# Patient Record
Sex: FEMALE | Race: BLACK OR AFRICAN AMERICAN | Employment: OTHER | URBAN - METROPOLITAN AREA
[De-identification: names, ages, dates, MRNs, and addresses within clinical notes are randomized per-mention and may not be internally consistent; named-entity substitution may affect disease eponyms.]

---

## 2019-05-29 ENCOUNTER — HOSPITAL ENCOUNTER (OUTPATIENT)
Dept: PREADMISSION TESTING | Age: 84
Discharge: HOME | End: 2019-05-29
Payer: MEDICARE

## 2019-05-29 DIAGNOSIS — Z01.818 PREOPERATIVE EXAMINATION, UNSPECIFIED: ICD-10-CM

## 2019-05-29 LAB
ANION GAP SERPL CALC-SCNC: 8 MMOL/L (ref 3–18)
BUN SERPL-MCNC: 16 MG/DL (ref 7–18)
BUN/CREAT SERPL: 17 (ref 12–20)
CALCIUM SERPL-MCNC: 9.4 MG/DL (ref 8.5–10.1)
CHLORIDE SERPL-SCNC: 109 MMOL/L (ref 100–108)
CO2 SERPL-SCNC: 28 MMOL/L (ref 21–32)
CREAT SERPL-MCNC: 0.92 MG/DL (ref 0.6–1.3)
GLUCOSE SERPL-MCNC: 75 MG/DL (ref 74–99)
HCT VFR BLD AUTO: 35.4 % (ref 35–45)
HGB BLD-MCNC: 11.3 G/DL (ref 12–16)
POTASSIUM SERPL-SCNC: 4.3 MMOL/L (ref 3.5–5.5)
SODIUM SERPL-SCNC: 145 MMOL/L (ref 136–145)

## 2019-05-29 PROCEDURE — 93005 ELECTROCARDIOGRAM TRACING: CPT

## 2019-05-29 PROCEDURE — 80048 BASIC METABOLIC PNL TOTAL CA: CPT

## 2019-05-29 PROCEDURE — 36415 COLL VENOUS BLD VENIPUNCTURE: CPT

## 2019-05-29 PROCEDURE — 85018 HEMOGLOBIN: CPT

## 2019-05-30 LAB
ATRIAL RATE: 58 BPM
CALCULATED P AXIS, ECG09: 62 DEGREES
CALCULATED R AXIS, ECG10: 55 DEGREES
CALCULATED T AXIS, ECG11: 72 DEGREES
DIAGNOSIS, 93000: NORMAL
FAX TO INFO,FAXT: NORMAL
FAX TO NUMBER,FAXN: NORMAL
P-R INTERVAL, ECG05: 196 MS
Q-T INTERVAL, ECG07: 460 MS
QRS DURATION, ECG06: 86 MS
QTC CALCULATION (BEZET), ECG08: 451 MS
VENTRICULAR RATE, ECG03: 58 BPM

## 2019-06-13 ENCOUNTER — HOSPITAL ENCOUNTER (OUTPATIENT)
Dept: LAB | Age: 84
Discharge: HOME | End: 2019-06-13
Payer: MEDICARE

## 2019-06-13 PROCEDURE — 88309 TISSUE EXAM BY PATHOLOGIST: CPT

## 2019-06-13 PROCEDURE — 88311 DECALCIFY TISSUE: CPT

## 2019-06-13 PROCEDURE — 88305 TISSUE EXAM BY PATHOLOGIST: CPT

## 2023-03-01 ENCOUNTER — HOSPITAL ENCOUNTER (INPATIENT)
Facility: HOSPITAL | Age: 88
LOS: 4 days | Discharge: HOME HEALTH | DRG: 505 | End: 2023-03-05
Attending: EMERGENCY MEDICINE | Admitting: FAMILY MEDICINE
Payer: MEDICARE

## 2023-03-01 DIAGNOSIS — M86.9 OSTEOMYELITIS OF SECOND TOE OF RIGHT FOOT (HCC): Primary | ICD-10-CM

## 2023-03-01 DIAGNOSIS — M86.9 OSTEOMYELITIS OF RIGHT FOOT, UNSPECIFIED TYPE (HCC): ICD-10-CM

## 2023-03-01 PROBLEM — I35.0 AORTIC VALVE STENOSIS, NONRHEUMATIC: Status: ACTIVE | Noted: 2023-03-01

## 2023-03-01 PROBLEM — M19.079 OSTEOARTHRITIS OF TOE: Status: ACTIVE | Noted: 2023-03-01

## 2023-03-01 PROBLEM — M19.079 OSTEOARTHRITIS OF TOE: Status: RESOLVED | Noted: 2023-03-01 | Resolved: 2023-03-01

## 2023-03-01 PROBLEM — I10 HYPERTENSION: Status: ACTIVE | Noted: 2023-03-01

## 2023-03-01 PROBLEM — H40.9 GLAUCOMA: Status: ACTIVE | Noted: 2023-03-01

## 2023-03-01 LAB
ANION GAP SERPL CALC-SCNC: 4 MMOL/L (ref 3–18)
APPEARANCE UR: NORMAL
BASOPHILS # BLD: 0.1 K/UL (ref 0–0.1)
BASOPHILS NFR BLD: 1 % (ref 0–2)
BILIRUB UR QL: NEGATIVE
BUN SERPL-MCNC: 31 MG/DL (ref 7–18)
BUN/CREAT SERPL: 34 (ref 12–20)
CALCIUM SERPL-MCNC: 9.4 MG/DL (ref 8.5–10.1)
CHLORIDE SERPL-SCNC: 115 MMOL/L (ref 100–111)
CO2 SERPL-SCNC: 22 MMOL/L (ref 21–32)
COLOR UR: YELLOW
CREAT SERPL-MCNC: 0.91 MG/DL (ref 0.6–1.3)
DIFFERENTIAL METHOD BLD: ABNORMAL
EOSINOPHIL # BLD: 0.2 K/UL (ref 0–0.4)
EOSINOPHIL NFR BLD: 4 % (ref 0–5)
ERYTHROCYTE [DISTWIDTH] IN BLOOD BY AUTOMATED COUNT: 14.8 % (ref 11.6–14.5)
GLUCOSE SERPL-MCNC: 85 MG/DL (ref 74–99)
GLUCOSE UR STRIP.AUTO-MCNC: NEGATIVE MG/DL
HCT VFR BLD AUTO: 34 % (ref 35–45)
HGB BLD-MCNC: 10.7 G/DL (ref 12–16)
HGB UR QL STRIP: NEGATIVE
IMM GRANULOCYTES # BLD AUTO: 0 K/UL (ref 0–0.04)
IMM GRANULOCYTES NFR BLD AUTO: 0 % (ref 0–0.5)
KETONES UR QL STRIP.AUTO: NEGATIVE MG/DL
LEUKOCYTE ESTERASE UR QL STRIP.AUTO: NEGATIVE
LYMPHOCYTES # BLD: 2.7 K/UL (ref 0.9–3.6)
LYMPHOCYTES NFR BLD: 51 % (ref 21–52)
MCH RBC QN AUTO: 29.7 PG (ref 24–34)
MCHC RBC AUTO-ENTMCNC: 31.5 G/DL (ref 31–37)
MCV RBC AUTO: 94.4 FL (ref 78–100)
MONOCYTES # BLD: 0.5 K/UL (ref 0.05–1.2)
MONOCYTES NFR BLD: 10 % (ref 3–10)
NEUTS SEG # BLD: 1.8 K/UL (ref 1.8–8)
NEUTS SEG NFR BLD: 34 % (ref 40–73)
NITRITE UR QL STRIP.AUTO: NEGATIVE
NRBC # BLD: 0 K/UL (ref 0–0.01)
NRBC BLD-RTO: 0 PER 100 WBC
PH UR STRIP: 6.5 (ref 5–8)
PLATELET # BLD AUTO: 174 K/UL (ref 135–420)
PMV BLD AUTO: 11.7 FL (ref 9.2–11.8)
POTASSIUM SERPL-SCNC: 4.7 MMOL/L (ref 3.5–5.5)
PROT UR STRIP-MCNC: NEGATIVE MG/DL
RBC # BLD AUTO: 3.6 M/UL (ref 4.2–5.3)
SODIUM SERPL-SCNC: 141 MMOL/L (ref 136–145)
SP GR UR REFRACTOMETRY: 1.02 (ref 1–1.03)
UROBILINOGEN UR QL STRIP.AUTO: 0.2 EU/DL (ref 0.2–1)
WBC # BLD AUTO: 5.3 K/UL (ref 4.6–13.2)

## 2023-03-01 PROCEDURE — 6360000002 HC RX W HCPCS: Performed by: PHYSICIAN ASSISTANT

## 2023-03-01 PROCEDURE — 2580000003 HC RX 258: Performed by: PHYSICIAN ASSISTANT

## 2023-03-01 PROCEDURE — 2580000003 HC RX 258: Performed by: FAMILY MEDICINE

## 2023-03-01 PROCEDURE — 99285 EMERGENCY DEPT VISIT HI MDM: CPT

## 2023-03-01 PROCEDURE — 85025 COMPLETE CBC W/AUTO DIFF WBC: CPT

## 2023-03-01 PROCEDURE — 80048 BASIC METABOLIC PNL TOTAL CA: CPT

## 2023-03-01 PROCEDURE — 96374 THER/PROPH/DIAG INJ IV PUSH: CPT

## 2023-03-01 PROCEDURE — 81003 URINALYSIS AUTO W/O SCOPE: CPT

## 2023-03-01 PROCEDURE — 1100000000 HC RM PRIVATE

## 2023-03-01 RX ORDER — FAMOTIDINE 20 MG/1
20 TABLET, FILM COATED ORAL 2 TIMES DAILY
COMMUNITY

## 2023-03-01 RX ORDER — MORPHINE SULFATE 2 MG/ML
2 INJECTION, SOLUTION INTRAMUSCULAR; INTRAVENOUS
Status: DISCONTINUED | OUTPATIENT
Start: 2023-03-01 | End: 2023-03-05 | Stop reason: HOSPADM

## 2023-03-01 RX ORDER — AMLODIPINE BESYLATE AND ATORVASTATIN CALCIUM 10; 10 MG/1; MG/1
10 TABLET, FILM COATED ORAL
COMMUNITY
Start: 2023-03-01

## 2023-03-01 RX ORDER — SODIUM CHLORIDE 0.9 % (FLUSH) 0.9 %
5-40 SYRINGE (ML) INJECTION PRN
Status: DISCONTINUED | OUTPATIENT
Start: 2023-03-01 | End: 2023-03-05 | Stop reason: HOSPADM

## 2023-03-01 RX ORDER — OXYCODONE HYDROCHLORIDE 5 MG/1
10 TABLET ORAL EVERY 4 HOURS PRN
Status: DISCONTINUED | OUTPATIENT
Start: 2023-03-01 | End: 2023-03-02

## 2023-03-01 RX ORDER — SODIUM CHLORIDE 0.9 % (FLUSH) 0.9 %
5-40 SYRINGE (ML) INJECTION EVERY 12 HOURS SCHEDULED
Status: DISCONTINUED | OUTPATIENT
Start: 2023-03-01 | End: 2023-03-05 | Stop reason: HOSPADM

## 2023-03-01 RX ORDER — ACETAMINOPHEN 325 MG/1
650 TABLET ORAL EVERY 4 HOURS PRN
Status: DISCONTINUED | OUTPATIENT
Start: 2023-03-01 | End: 2023-03-05 | Stop reason: HOSPADM

## 2023-03-01 RX ORDER — PREGABALIN 50 MG/1
50 CAPSULE ORAL 3 TIMES DAILY
COMMUNITY

## 2023-03-01 RX ORDER — OXYCODONE HYDROCHLORIDE AND ACETAMINOPHEN 5; 325 MG/1; MG/1
1 TABLET ORAL EVERY 4 HOURS PRN
COMMUNITY

## 2023-03-01 RX ORDER — ONDANSETRON 4 MG/1
4 TABLET, ORALLY DISINTEGRATING ORAL EVERY 8 HOURS PRN
Status: DISCONTINUED | OUTPATIENT
Start: 2023-03-01 | End: 2023-03-05 | Stop reason: HOSPADM

## 2023-03-01 RX ORDER — TRIAMTERENE AND HYDROCHLOROTHIAZIDE 37.5; 25 MG/1; MG/1
1 TABLET ORAL DAILY
COMMUNITY
Start: 2019-08-22

## 2023-03-01 RX ORDER — OXYCODONE HYDROCHLORIDE 5 MG/1
5 TABLET ORAL EVERY 4 HOURS PRN
Status: DISCONTINUED | OUTPATIENT
Start: 2023-03-01 | End: 2023-03-02

## 2023-03-01 RX ORDER — POLYETHYLENE GLYCOL 3350 17 G/17G
17 POWDER, FOR SOLUTION ORAL DAILY PRN
Status: DISCONTINUED | OUTPATIENT
Start: 2023-03-01 | End: 2023-03-05 | Stop reason: HOSPADM

## 2023-03-01 RX ORDER — MONTELUKAST SODIUM 10 MG/1
10 TABLET ORAL NIGHTLY
COMMUNITY

## 2023-03-01 RX ORDER — SODIUM CHLORIDE 9 MG/ML
INJECTION, SOLUTION INTRAVENOUS PRN
Status: DISCONTINUED | OUTPATIENT
Start: 2023-03-01 | End: 2023-03-05 | Stop reason: HOSPADM

## 2023-03-01 RX ORDER — ONDANSETRON 2 MG/ML
4 INJECTION INTRAMUSCULAR; INTRAVENOUS EVERY 6 HOURS PRN
Status: DISCONTINUED | OUTPATIENT
Start: 2023-03-01 | End: 2023-03-05 | Stop reason: HOSPADM

## 2023-03-01 RX ADMIN — VANCOMYCIN HYDROCHLORIDE 1250 MG: 10 INJECTION, POWDER, LYOPHILIZED, FOR SOLUTION INTRAVENOUS at 18:52

## 2023-03-01 RX ADMIN — SODIUM CHLORIDE, PRESERVATIVE FREE 5 ML: 5 INJECTION INTRAVENOUS at 23:16

## 2023-03-01 ASSESSMENT — PAIN DESCRIPTION - LOCATION: LOCATION: FOOT

## 2023-03-01 ASSESSMENT — PAIN - FUNCTIONAL ASSESSMENT
PAIN_FUNCTIONAL_ASSESSMENT: 0-10
PAIN_FUNCTIONAL_ASSESSMENT: NONE - DENIES PAIN

## 2023-03-01 ASSESSMENT — PAIN DESCRIPTION - DESCRIPTORS: DESCRIPTORS: ACHING

## 2023-03-01 ASSESSMENT — PAIN DESCRIPTION - ORIENTATION: ORIENTATION: RIGHT

## 2023-03-01 NOTE — ED PROVIDER NOTES
[facility] EMERGENCY DEPT  EMERGENCY DEPARTMENT ENCOUNTER       Pt Name: Gurdeep Lee  MRN: [id number]  [place] 1/16/1931  Date of evaluation: 3/1/2023  Provider: GUERO Mathias   PCP: Al Trujillo MD  Note Started: 7:18 PM 3/1/23     CHIEF COMPLAINT       Chief Complaint   Patient presents with    Foot Pain        HISTORY OF PRESENT ILLNESS: 1 or more elements      History From: Patient  HPI Limitations: None  Chronic Conditions: none  Social Determinants affecting Dx or Tx: none      Gurdeep Lee is a 80 y.o. female who presents to ED c/o discoloration to the right second toe. Patient states she has had discomfort swelling and redness to her right second toe for 2-3 months, seen in urgent care, took unknown medication. She followed up with [facility] podiatrist Dr. Braeden Morton yesterday and presents to ED today stating that she was supposed to be admitted for right second toe amputation tomorrow. She denies any known medical problems, does not currently take any medications. She had a left second toe amputation in the past without complication. Nursing Notes were all reviewed and agreed with or any disagreements were addressed in the HPI. PAST HISTORY     Past Medical History:  No past medical history on file. Past Surgical History:  No past surgical history on file. Family History:  No family history on file. Social History:  Social History     Socioeconomic History    Marital status:        Allergies: Allergies   Allergen Reactions    Ace Inhibitors Anaphylaxis     Other reaction(s): anaphylaxis/angioedema      Cephalexin Rash and Other (See Comments)    Gabapentin Other (See Comments)     Other reaction(s): gi distress  Nausea at 300 mg dose. Nausea at 300 mg dose.        Iodinated Contrast Media Other (See Comments)     X-Ray Dye  Other reaction(s): unknown      Iodine Other (See Comments)    Timolol      Shortness of breath       CURRENT MEDICATIONS      Current Facility-Administered Medications   Medication Dose Route Frequency Provider Last Rate Last Admin    vancomycin (VANCOCIN) 1250 mg in sodium chloride 0.9% 250 mL IVPB  1,250 mg IntraVENous Once GUERO Taylor 166.7 mL/hr at 03/01/23 1852 1,250 mg at 03/01/23 7450    Vancomycin-Pharmacy to dose   Other RX Placeholder Jill GUERO Wick         No current outpatient medications on file. PHYSICAL EXAM      Vitals:    03/01/23 1455 03/01/23 1857   BP: (!) 153/56 (!) 143/55   Pulse: 65 55   Resp: 17    Temp: 97.5 °F (36.4 °C)    SpO2: 98%    Weight: 140 lb (63.5 kg)    Height: 5' 2\" (1.575 m)      Physical Exam  Vital signs and nursing notes reviewed. CONSTITUTIONAL: Alert. Well-appearing; well-nourished; in no apparent distress. HEAD: Normocephalic; atraumatic. EXT: RLE: Second toe is mildly swollen, hyperpigmented and tender with approximately 1 cm superficial ulceration medial mid toe. Rest of toes nontender/atraumatic without discoloration. SKIN: Normal for age and race; warm; dry; good turgor; no apparent lesions or exudate. NEURO: A & O x3. PSYCH:  Mood and affect appropriate.         DIAGNOSTIC RESULTS   LABS:    Recent Results (from the past 24 hour(s))   Urinalysis    Collection Time: 03/01/23  2:59 PM   Result Value Ref Range    Color, UA YELLOW      Appearance CLOUDY      Specific Gravity, UA 1.022 1.005 - 1.030      pH, Urine 6.5 5.0 - 8.0      Protein, UA Negative NEG mg/dL    Glucose, UA Negative NEG mg/dL    Ketones, Urine Negative NEG mg/dL    Bilirubin Urine Negative NEG      Blood, Urine Negative NEG      Urobilinogen, Urine 0.2 0.2 - 1.0 EU/dL    Nitrite, Urine Negative NEG      Leukocyte Esterase, Urine Negative NEG     CBC with Auto Differential    Collection Time: 03/01/23  6:08 PM   Result Value Ref Range    WBC 5.3 4.6 - 13.2 K/uL    RBC 3.60 (L) 4.20 - 5.30 M/uL    Hemoglobin 10.7 (L) 12.0 - 16.0 g/dL    Hematocrit 34.0 (L) 35.0 - 45.0 %    MCV 94.4 78.0 - 100.0 FL    MCH 29.7 24.0 - 34.0 PG    MCHC 31.5 31.0 - 37.0 g/dL    RDW 14.8 (H) 11.6 - 14.5 %    Platelets 072 177 - 665 K/uL    MPV 11.7 9.2 - 11.8 FL    Nucleated RBCs 0.0 0  WBC    nRBC 0.00 0.00 - 0.01 K/uL    Seg Neutrophils 34 (L) 40 - 73 %    Lymphocytes 51 21 - 52 %    Monocytes 10 3 - 10 %    Eosinophils % 4 0 - 5 %    Basophils 1 0 - 2 %    Immature Granulocytes 0 0.0 - 0.5 %    Segs Absolute 1.8 1.8 - 8.0 K/UL    Absolute Lymph # 2.7 0.9 - 3.6 K/UL    Absolute Mono # 0.5 0.05 - 1.2 K/UL    Absolute Eos # 0.2 0.0 - 0.4 K/UL    Basophils Absolute 0.1 0.0 - 0.1 K/UL    Absolute Immature Granulocyte 0.0 0.00 - 0.04 K/UL    Differential Type AUTOMATED     Basic Metabolic Panel    Collection Time: 03/01/23  6:08 PM   Result Value Ref Range    Sodium 141 136 - 145 mmol/L    Potassium 4.7 3.5 - 5.5 mmol/L    Chloride 115 (H) 100 - 111 mmol/L    CO2 22 21 - 32 mmol/L    Anion Gap 4 3.0 - 18 mmol/L    Glucose 85 74 - 99 mg/dL    BUN 31 (H) 7.0 - 18 MG/DL    Creatinine 0.91 0.6 - 1.3 MG/DL    Bun/Cre Ratio 34 (H) 12 - 20      Est, Glom Filt Rate 59 (L) >60 ml/min/1.73m2    Calcium 9.4 8.5 - 10.1 MG/DL       Labs Reviewed   CBC WITH AUTO DIFFERENTIAL - Abnormal; Notable for the following components:       Result Value    RBC 3.60 (*)     Hemoglobin 10.7 (*)     Hematocrit 34.0 (*)     RDW 14.8 (*)     Seg Neutrophils 34 (*)     All other components within normal limits   BASIC METABOLIC PANEL - Abnormal; Notable for the following components:    Chloride 115 (*)     BUN 31 (*)     Bun/Cre Ratio 34 (*)     Est, Glom Filt Rate 59 (*)     All other components within normal limits   URINALYSIS     No orders to display           PROCEDURES   Unless otherwise noted below, none  Procedures         CRITICAL CARE TIME   none    EMERGENCY DEPARTMENT COURSE and DIFFERENTIAL DIAGNOSIS/MDM   Vitals:    Vitals:    03/01/23 1455 03/01/23 1857   BP: (!) 153/56 (!) 143/55   Pulse: 65 55   Resp: 17    Temp: 97.5 °F (36.4 °C)    SpO2: 98% Weight: 140 lb (63.5 kg)    Height: 5' 2\" (1.575 m)        Patient was given the following medications:  Medications   vancomycin (VANCOCIN) 1250 mg in sodium chloride 0.9% 250 mL IVPB (1,250 mg IntraVENous New Bag 3/1/23 584)   Vancomycin-Pharmacy to dose (has no administration in time range)           Records Reviewed (source and summary): Nursing notes. ED COURSE  ED Course as of 03/01/23 1918   Wed Mar 01, 2023   174 Case discussed with podiatrist with [facility] Flores, who recommends vancomycine for right second toe osteomyelitis, admission to the hospitalist and he will consult after medical/cardiac clearance with plan for right second toe amputation. He has her on the OR schedule for tomorrow. [MD]   0560 Case discussed with hospitalist Dr. Jamel Blandon, who will admit to medical floor. [MD]      ED Course User Index  [MD] GUERO Spears       [facility] Decision Makin y.o. female  In evaluation of the above differential diagnosis, consideration was given to the following tests and treatments: labs. I discussed each of these tests and considerations with the patient. They agree with the plan of admission and for medical clearance and then amputation of right second toe, as per podiatrist recommendation. FINAL IMPRESSION     1. Osteomyelitis of second toe of right foot [facility])            DISPOSITION/PLAN   DISPOSITION Admitted 2023 07:17:15 PM      Admitted        I am the Primary Clinician of Record. (Please note that parts of this dictation were completed with voice recognition software. Quite often unanticipated grammatical, syntax, homophones, and other interpretive errors are inadvertently transcribed by the computer software. Please disregards these errors.  Please excuse any errors that have escaped final proofreading.)       Sergio Spears  23

## 2023-03-01 NOTE — ED TRIAGE NOTES
Pt report that she was instructed to visit ed by PCP, MD Lo. Pt report that she fell sept 2022, 2nd toe, right foot, started to appear black. PCP recommended amputation. Denies hx of DM

## 2023-03-02 ENCOUNTER — ANESTHESIA (OUTPATIENT)
Facility: HOSPITAL | Age: 88
End: 2023-03-02
Payer: MEDICARE

## 2023-03-02 ENCOUNTER — ANESTHESIA EVENT (OUTPATIENT)
Facility: HOSPITAL | Age: 88
End: 2023-03-02
Payer: MEDICARE

## 2023-03-02 LAB
ANION GAP SERPL CALC-SCNC: 5 MMOL/L (ref 3–18)
BUN SERPL-MCNC: 27 MG/DL (ref 7–18)
BUN/CREAT SERPL: 28 (ref 12–20)
CALCIUM SERPL-MCNC: 9 MG/DL (ref 8.5–10.1)
CHLORIDE SERPL-SCNC: 115 MMOL/L (ref 100–111)
CO2 SERPL-SCNC: 22 MMOL/L (ref 21–32)
CREAT SERPL-MCNC: 0.96 MG/DL (ref 0.6–1.3)
ERYTHROCYTE [DISTWIDTH] IN BLOOD BY AUTOMATED COUNT: 15.2 % (ref 11.6–14.5)
GLUCOSE SERPL-MCNC: 84 MG/DL (ref 74–99)
HCT VFR BLD AUTO: 32.5 % (ref 35–45)
HGB BLD-MCNC: 10.2 G/DL (ref 12–16)
INR PPP: 1 (ref 0.8–1.2)
MCH RBC QN AUTO: 30.6 PG (ref 24–34)
MCHC RBC AUTO-ENTMCNC: 31.4 G/DL (ref 31–37)
MCV RBC AUTO: 97.6 FL (ref 78–100)
NRBC # BLD: 0 K/UL (ref 0–0.01)
NRBC BLD-RTO: 0 PER 100 WBC
PLATELET # BLD AUTO: 160 K/UL (ref 135–420)
PMV BLD AUTO: 11.9 FL (ref 9.2–11.8)
POTASSIUM SERPL-SCNC: 4.3 MMOL/L (ref 3.5–5.5)
PROTHROMBIN TIME: 13.6 SEC (ref 11.5–15.2)
RBC # BLD AUTO: 3.33 M/UL (ref 4.2–5.3)
SODIUM SERPL-SCNC: 142 MMOL/L (ref 136–145)
WBC # BLD AUTO: 6.1 K/UL (ref 4.6–13.2)

## 2023-03-02 PROCEDURE — 87205 SMEAR GRAM STAIN: CPT

## 2023-03-02 PROCEDURE — 88311 DECALCIFY TISSUE: CPT

## 2023-03-02 PROCEDURE — 6360000002 HC RX W HCPCS: Performed by: PODIATRIST

## 2023-03-02 PROCEDURE — 1100000000 HC RM PRIVATE

## 2023-03-02 PROCEDURE — 2580000003 HC RX 258: Performed by: PODIATRIST

## 2023-03-02 PROCEDURE — 97116 GAIT TRAINING THERAPY: CPT

## 2023-03-02 PROCEDURE — 3700000001 HC ADD 15 MINUTES (ANESTHESIA): Performed by: PODIATRIST

## 2023-03-02 PROCEDURE — 36415 COLL VENOUS BLD VENIPUNCTURE: CPT

## 2023-03-02 PROCEDURE — 99221 1ST HOSP IP/OBS SF/LOW 40: CPT | Performed by: NURSE PRACTITIONER

## 2023-03-02 PROCEDURE — 2709999900 HC NON-CHARGEABLE SUPPLY: Performed by: PODIATRIST

## 2023-03-02 PROCEDURE — 97530 THERAPEUTIC ACTIVITIES: CPT

## 2023-03-02 PROCEDURE — 3600000002 HC SURGERY LEVEL 2 BASE: Performed by: PODIATRIST

## 2023-03-02 PROCEDURE — 6360000002 HC RX W HCPCS: Performed by: NURSE ANESTHETIST, CERTIFIED REGISTERED

## 2023-03-02 PROCEDURE — 88305 TISSUE EXAM BY PATHOLOGIST: CPT

## 2023-03-02 PROCEDURE — 6370000000 HC RX 637 (ALT 250 FOR IP): Performed by: PODIATRIST

## 2023-03-02 PROCEDURE — 97162 PT EVAL MOD COMPLEX 30 MIN: CPT

## 2023-03-02 PROCEDURE — 7100000000 HC PACU RECOVERY - FIRST 15 MIN: Performed by: PODIATRIST

## 2023-03-02 PROCEDURE — 85027 COMPLETE CBC AUTOMATED: CPT

## 2023-03-02 PROCEDURE — 2500000003 HC RX 250 WO HCPCS: Performed by: NURSE ANESTHETIST, CERTIFIED REGISTERED

## 2023-03-02 PROCEDURE — 3600000012 HC SURGERY LEVEL 2 ADDTL 15MIN: Performed by: PODIATRIST

## 2023-03-02 PROCEDURE — 6370000000 HC RX 637 (ALT 250 FOR IP): Performed by: HOSPITALIST

## 2023-03-02 PROCEDURE — 85610 PROTHROMBIN TIME: CPT

## 2023-03-02 PROCEDURE — 7100000001 HC PACU RECOVERY - ADDTL 15 MIN: Performed by: PODIATRIST

## 2023-03-02 PROCEDURE — 2500000003 HC RX 250 WO HCPCS: Performed by: PODIATRIST

## 2023-03-02 PROCEDURE — 6360000002 HC RX W HCPCS: Performed by: HOSPITALIST

## 2023-03-02 PROCEDURE — 0Y6R0Z0 DETACHMENT AT RIGHT 2ND TOE, COMPLETE, OPEN APPROACH: ICD-10-PCS | Performed by: PODIATRIST

## 2023-03-02 PROCEDURE — 80048 BASIC METABOLIC PNL TOTAL CA: CPT

## 2023-03-02 PROCEDURE — 3700000000 HC ANESTHESIA ATTENDED CARE: Performed by: PODIATRIST

## 2023-03-02 PROCEDURE — 87075 CULTR BACTERIA EXCEPT BLOOD: CPT

## 2023-03-02 RX ORDER — SODIUM CHLORIDE 0.9 % (FLUSH) 0.9 %
5-40 SYRINGE (ML) INJECTION EVERY 12 HOURS SCHEDULED
Status: DISCONTINUED | OUTPATIENT
Start: 2023-03-02 | End: 2023-03-02

## 2023-03-02 RX ORDER — IPRATROPIUM BROMIDE AND ALBUTEROL SULFATE 2.5; .5 MG/3ML; MG/3ML
1 SOLUTION RESPIRATORY (INHALATION)
Status: DISCONTINUED | OUTPATIENT
Start: 2023-03-02 | End: 2023-03-02

## 2023-03-02 RX ORDER — HYDROMORPHONE HYDROCHLORIDE 1 MG/ML
0.25 INJECTION, SOLUTION INTRAMUSCULAR; INTRAVENOUS; SUBCUTANEOUS EVERY 5 MIN PRN
Status: DISCONTINUED | OUTPATIENT
Start: 2023-03-02 | End: 2023-03-02

## 2023-03-02 RX ORDER — SODIUM CHLORIDE, SODIUM LACTATE, POTASSIUM CHLORIDE, CALCIUM CHLORIDE 600; 310; 30; 20 MG/100ML; MG/100ML; MG/100ML; MG/100ML
INJECTION, SOLUTION INTRAVENOUS CONTINUOUS
Status: DISCONTINUED | OUTPATIENT
Start: 2023-03-02 | End: 2023-03-02 | Stop reason: HOSPADM

## 2023-03-02 RX ORDER — FAMOTIDINE 20 MG/1
20 TABLET, FILM COATED ORAL DAILY
Status: DISCONTINUED | OUTPATIENT
Start: 2023-03-03 | End: 2023-03-05 | Stop reason: HOSPADM

## 2023-03-02 RX ORDER — FAMOTIDINE 20 MG/1
20 TABLET, FILM COATED ORAL 2 TIMES DAILY
Status: DISCONTINUED | OUTPATIENT
Start: 2023-03-02 | End: 2023-03-02

## 2023-03-02 RX ORDER — DROPERIDOL 2.5 MG/ML
0.62 INJECTION, SOLUTION INTRAMUSCULAR; INTRAVENOUS
Status: DISCONTINUED | OUTPATIENT
Start: 2023-03-02 | End: 2023-03-02

## 2023-03-02 RX ORDER — ATORVASTATIN CALCIUM 10 MG/1
10 TABLET, FILM COATED ORAL DAILY
Status: DISCONTINUED | OUTPATIENT
Start: 2023-03-02 | End: 2023-03-05 | Stop reason: HOSPADM

## 2023-03-02 RX ORDER — DIPHENHYDRAMINE HYDROCHLORIDE 50 MG/ML
12.5 INJECTION INTRAMUSCULAR; INTRAVENOUS
Status: DISCONTINUED | OUTPATIENT
Start: 2023-03-02 | End: 2023-03-02

## 2023-03-02 RX ORDER — ONDANSETRON 2 MG/ML
4 INJECTION INTRAMUSCULAR; INTRAVENOUS
Status: DISCONTINUED | OUTPATIENT
Start: 2023-03-02 | End: 2023-03-02

## 2023-03-02 RX ORDER — OXYCODONE HYDROCHLORIDE 5 MG/1
5 TABLET ORAL
Status: DISCONTINUED | OUTPATIENT
Start: 2023-03-02 | End: 2023-03-02

## 2023-03-02 RX ORDER — SODIUM CHLORIDE, SODIUM LACTATE, POTASSIUM CHLORIDE, CALCIUM CHLORIDE 600; 310; 30; 20 MG/100ML; MG/100ML; MG/100ML; MG/100ML
INJECTION, SOLUTION INTRAVENOUS CONTINUOUS
Status: DISCONTINUED | OUTPATIENT
Start: 2023-03-02 | End: 2023-03-02

## 2023-03-02 RX ORDER — LEVOFLOXACIN 5 MG/ML
250 INJECTION, SOLUTION INTRAVENOUS EVERY 24 HOURS
Status: DISCONTINUED | OUTPATIENT
Start: 2023-03-02 | End: 2023-03-03

## 2023-03-02 RX ORDER — SODIUM CHLORIDE 9 MG/ML
INJECTION, SOLUTION INTRAVENOUS PRN
Status: DISCONTINUED | OUTPATIENT
Start: 2023-03-02 | End: 2023-03-02

## 2023-03-02 RX ORDER — ENOXAPARIN SODIUM 100 MG/ML
30 INJECTION SUBCUTANEOUS DAILY
Status: DISCONTINUED | OUTPATIENT
Start: 2023-03-02 | End: 2023-03-05 | Stop reason: HOSPADM

## 2023-03-02 RX ORDER — SODIUM CHLORIDE 0.9 % (FLUSH) 0.9 %
5-40 SYRINGE (ML) INJECTION PRN
Status: DISCONTINUED | OUTPATIENT
Start: 2023-03-02 | End: 2023-03-02

## 2023-03-02 RX ORDER — LATANOPROST 50 UG/ML
1 SOLUTION/ DROPS OPHTHALMIC EVERY EVENING
Status: DISCONTINUED | OUTPATIENT
Start: 2023-03-02 | End: 2023-03-05 | Stop reason: HOSPADM

## 2023-03-02 RX ORDER — BRIMONIDINE TARTRATE 2 MG/ML
1 SOLUTION/ DROPS OPHTHALMIC EVERY 8 HOURS SCHEDULED
Status: DISCONTINUED | OUTPATIENT
Start: 2023-03-02 | End: 2023-03-05 | Stop reason: HOSPADM

## 2023-03-02 RX ORDER — AMLODIPINE BESYLATE AND ATORVASTATIN CALCIUM 10; 10 MG/1; MG/1
1 TABLET, FILM COATED ORAL DAILY
Status: DISCONTINUED | OUTPATIENT
Start: 2023-03-02 | End: 2023-03-02 | Stop reason: RX

## 2023-03-02 RX ORDER — PROPOFOL 10 MG/ML
INJECTION, EMULSION INTRAVENOUS PRN
Status: DISCONTINUED | OUTPATIENT
Start: 2023-03-02 | End: 2023-03-02 | Stop reason: SDUPTHER

## 2023-03-02 RX ORDER — KETAMINE HCL IN NACL, ISO-OSM 100MG/10ML
SYRINGE (ML) INJECTION PRN
Status: DISCONTINUED | OUTPATIENT
Start: 2023-03-02 | End: 2023-03-02 | Stop reason: SDUPTHER

## 2023-03-02 RX ORDER — AMLODIPINE BESYLATE 5 MG/1
10 TABLET ORAL DAILY
Status: DISCONTINUED | OUTPATIENT
Start: 2023-03-02 | End: 2023-03-05 | Stop reason: HOSPADM

## 2023-03-02 RX ORDER — LABETALOL HYDROCHLORIDE 5 MG/ML
10 INJECTION, SOLUTION INTRAVENOUS
Status: DISCONTINUED | OUTPATIENT
Start: 2023-03-02 | End: 2023-03-02

## 2023-03-02 RX ORDER — FENTANYL CITRATE 50 UG/ML
25 INJECTION, SOLUTION INTRAMUSCULAR; INTRAVENOUS EVERY 5 MIN PRN
Status: DISCONTINUED | OUTPATIENT
Start: 2023-03-02 | End: 2023-03-02

## 2023-03-02 RX ORDER — GLYCOPYRROLATE 0.2 MG/ML
INJECTION INTRAMUSCULAR; INTRAVENOUS PRN
Status: DISCONTINUED | OUTPATIENT
Start: 2023-03-02 | End: 2023-03-02 | Stop reason: SDUPTHER

## 2023-03-02 RX ORDER — OXYCODONE HYDROCHLORIDE 5 MG/1
5 TABLET ORAL EVERY 4 HOURS PRN
Status: DISCONTINUED | OUTPATIENT
Start: 2023-03-02 | End: 2023-03-05 | Stop reason: HOSPADM

## 2023-03-02 RX ORDER — DORZOLAMIDE HCL 20 MG/ML
1 SOLUTION/ DROPS OPHTHALMIC 3 TIMES DAILY
Status: DISCONTINUED | OUTPATIENT
Start: 2023-03-02 | End: 2023-03-05 | Stop reason: HOSPADM

## 2023-03-02 RX ORDER — LIDOCAINE HYDROCHLORIDE 20 MG/ML
INJECTION, SOLUTION EPIDURAL; INFILTRATION; INTRACAUDAL; PERINEURAL PRN
Status: DISCONTINUED | OUTPATIENT
Start: 2023-03-02 | End: 2023-03-02 | Stop reason: SDUPTHER

## 2023-03-02 RX ADMIN — DORZOLAMIDE HYDROCHLORIDE 1 DROP: 20 SOLUTION/ DROPS OPHTHALMIC at 14:56

## 2023-03-02 RX ADMIN — PROPOFOL 50 MG: 10 INJECTION, EMULSION INTRAVENOUS at 08:48

## 2023-03-02 RX ADMIN — ENOXAPARIN SODIUM 30 MG: 100 INJECTION SUBCUTANEOUS at 15:13

## 2023-03-02 RX ADMIN — ATORVASTATIN CALCIUM 10 MG: 10 TABLET, FILM COATED ORAL at 11:27

## 2023-03-02 RX ADMIN — GLYCOPYRROLATE 0.2 MG: 0.2 INJECTION, SOLUTION INTRAMUSCULAR; INTRAVENOUS at 09:02

## 2023-03-02 RX ADMIN — Medication 10 MG: at 09:01

## 2023-03-02 RX ADMIN — OXYCODONE 5 MG: 5 TABLET ORAL at 18:29

## 2023-03-02 RX ADMIN — LEVOFLOXACIN 250 MG: 5 INJECTION, SOLUTION INTRAVENOUS at 15:11

## 2023-03-02 RX ADMIN — MORPHINE SULFATE 2 MG: 2 INJECTION, SOLUTION INTRAMUSCULAR; INTRAVENOUS at 20:36

## 2023-03-02 RX ADMIN — PROPOFOL 20 MG: 10 INJECTION, EMULSION INTRAVENOUS at 08:53

## 2023-03-02 RX ADMIN — PROPOFOL 20 MG: 10 INJECTION, EMULSION INTRAVENOUS at 08:51

## 2023-03-02 RX ADMIN — FAMOTIDINE 20 MG: 20 TABLET, FILM COATED ORAL at 11:23

## 2023-03-02 RX ADMIN — PROPOFOL 125 MCG/KG/MIN: 10 INJECTION, EMULSION INTRAVENOUS at 08:55

## 2023-03-02 RX ADMIN — BRIMONIDINE TARTRATE 1 DROP: 2 SOLUTION OPHTHALMIC at 20:32

## 2023-03-02 RX ADMIN — DORZOLAMIDE HYDROCHLORIDE 1 DROP: 20 SOLUTION/ DROPS OPHTHALMIC at 20:32

## 2023-03-02 RX ADMIN — SODIUM CHLORIDE, PRESERVATIVE FREE 10 ML: 5 INJECTION INTRAVENOUS at 20:30

## 2023-03-02 RX ADMIN — BRIMONIDINE TARTRATE 1 DROP: 2 SOLUTION OPHTHALMIC at 14:56

## 2023-03-02 RX ADMIN — VANCOMYCIN HYDROCHLORIDE 1000 MG: 1 INJECTION, POWDER, LYOPHILIZED, FOR SOLUTION INTRAVENOUS at 18:31

## 2023-03-02 RX ADMIN — LIDOCAINE HYDROCHLORIDE 60 MG: 20 INJECTION, SOLUTION EPIDURAL; INFILTRATION; INTRACAUDAL; PERINEURAL at 08:48

## 2023-03-02 RX ADMIN — LATANOPROST 1 DROP: 50 SOLUTION OPHTHALMIC at 20:27

## 2023-03-02 RX ADMIN — AMLODIPINE BESYLATE 10 MG: 5 TABLET ORAL at 11:27

## 2023-03-02 RX ADMIN — SODIUM CHLORIDE, POTASSIUM CHLORIDE, SODIUM LACTATE AND CALCIUM CHLORIDE: 600; 310; 30; 20 INJECTION, SOLUTION INTRAVENOUS at 08:34

## 2023-03-02 ASSESSMENT — PAIN - FUNCTIONAL ASSESSMENT: PAIN_FUNCTIONAL_ASSESSMENT: 0-10

## 2023-03-02 ASSESSMENT — PAIN SCALES - GENERAL
PAINLEVEL_OUTOF10: 7
PAINLEVEL_OUTOF10: 10

## 2023-03-02 ASSESSMENT — PAIN DESCRIPTION - DESCRIPTORS: DESCRIPTORS: ACHING

## 2023-03-02 ASSESSMENT — PAIN DESCRIPTION - LOCATION
LOCATION: FOOT
LOCATION: FOOT

## 2023-03-02 ASSESSMENT — PAIN DESCRIPTION - ORIENTATION
ORIENTATION: RIGHT
ORIENTATION: RIGHT

## 2023-03-02 NOTE — ED NOTES
TRANSFER - OUT REPORT:    Verbal report given to Daryl on Angela Lucero  being transferred to [facility] for routine progression of patient care       Report consisted of patient's Situation, Background, Assessment and   Recommendations(SBAR).     Information from the following report(s) Nurse Handoff Report was reviewed with the receiving nurse.  [place] Assessment: No data recorded  Lines:   Peripheral IV 03/01/23 Right Forearm (Active)        Opportunity for questions and clarification was provided.      Patient transported with:  Lacey Sainz RN  03/01/23 2009

## 2023-03-02 NOTE — ANESTHESIA POSTPROCEDURE EVALUATION
Post-Anesthesia Evaluation and Assessment    Cardiovascular Function/Vital Signs/Pain Score:  Vitals  BP: (!) 163/63  Temp: 98.7 °F (37.1 °C)  Temp Source: Temporal  Heart Rate: 52  Resp: 12  SpO2: 98 %  Height: 5' 2\" (157.5 cm)  Weight: 141 lb (64 kg)  Pain Level: 0    Patient is status post Procedure(s):  RIGHT FOOT 2ND TOE AMPUTATION PT IN ROOM #328. Nausea/Vomiting: Controlled. Postoperative hydration reviewed and adequate. Pain:  Managed    Mental Status and Level of Consciousness: Baseline and appropriate for discharge. Pulmonary Status:   Vitals  Temp: 98.7 °F (37.1 °C)  Temp Source: Temporal  Heart Rate: 52  Heart Rate Source: Monitor  Resp: 12  BP: (!) 163/63  MAP (Calculated): 96  MAP (mmHg): 105  BP Location: Right upper arm  BP Method: Automatic  Patient Position: Supine  Level of Consciousness: Responds to voice (1)  MEWS Score: 1  O2 Device: None (Room air)    Adequate oxygenation and airway patent. Complications related to anesthesia: None    Post-anesthesia assessment completed. No concerns. Patient has met all discharge requirements.     Signed By: Raina Kaye MD    March 2, 2023

## 2023-03-02 NOTE — BRIEF OP NOTE
Brief Postoperative Note      Patient: Kathleen Michael  YOB: 1931  MRN: [id number]    Date of Procedure: 3/2/2023    Pre-Op Diagnosis: OSTEOMYELITIS OF SECOND TOE OF RIGHT FOOT    Post-Op Diagnosis: Same       Procedure(s):  RIGHT FOOT 2ND TOE AMPUTATION PT IN ROOM #328    Surgeon(s):  Kendra Akbar DPM    Assistant:  * No surgical staff found *    Anesthesia: Monitor Anesthesia Care    Estimated Blood Loss (mL): less than 50     Complications: None    Specimens:   ID Type Source Tests Collected by Time Destination   1 : right second toe Swab Foot CULTURE, ANAEROBIC, CULTURE, WOUND Kendra Akbar DPM 3/2/2023 0685    A :  Tissue Toe SURGICAL PATHOLOGY Kendra Akbar DPM 3/2/2023 0907        Implants:  * No implants in log *      Drains: * No LDAs found *    Findings: consistent with preoperative diagnosis    Electronically signed by Kendra Akbar DPM on 3/2/2023 at 9:34 AM

## 2023-03-02 NOTE — H&P
History & Physical    Patient: Lux Boland MRN: [id number]  CSN: [id number]    YOB: 1931  Age: 80 y.o. Sex: female      DOA: 3/1/2023  Primary Care Provider:  Liudmila Mcneill MD      Assessment/Plan   Lux Boland is a 80 y.o. female has a history of glaucoma, hypertension, aortic valve stenosis, psoriatic arthritis on Humira every Friday and status post amputation of left toe previously. Admitted for osteomyelitis right second toe for amputation. Right second toe osteomyelitis   Chronic glaucoma  Chronic hypertension  Aortic valve stenosis, moderate  Psoriatic arthritis  Previous amputation of left toe    Admit to med/surg floor   Dr. Garrett Alfonso, podiatrist, consulted by ED, aware of pt and plans to take to OR tomorrow if possible   Cardiology consult placed for cardiac clearance for h/o aortic stenosis -Dr. Zavaleta Leaver aware  Resume home meds for HTN -order placed for nurse to complete medication reconciliation  Resume home meds for glaucoma -order placed for nurse to complete medication reconciliation    SCDs and pepcid for DVT and GI prophylaxis     Daughter at bedside, Najma Coronado, [phone], she is MPOA      Patient Active Problem List   Diagnosis    Osteomyelitis of second toe of right foot ([facility] [address]    Glaucoma    Hypertension    Aortic valve stenosis, nonrheumatic     Estimated length of stay : 2-3 days     CC: right 2nd toe osteomyelitis        HPI:     Lux Boland is a 80 y.o. female has a history of glaucoma, hypertension and aortic valve stenosis. Patient also has a history of psoriatic arthritis and takes Humira every Friday. Patient is also status post amputation of left toe previously. Presents to the emergency room today because she was sent by her podiatrist Dr. Dillard [facility] medicine because she has a right second toe that has osteomyelitis.   He wants the patient admitted to medicine team for cardiac clearance because he would like to take the patient to the OR tomorrow to amputate her second right toe.  Been following him her outpatient and sent her in today.  He requested that the ED team start the patient on IV vancomycin.   Discussion with the patient and her daughter revealed that she is under the care of of [facility] cardiology.  She used to be seen by her previous cardiologist who recently retired and now she says that her cardiologist is Dr. Cox and she was last seen by GUERO Ochoa.  She has a diagnosis of nonrheumatic aortic valve stenosis and just had an echocardiogram on 2/10/2023.     Echo done 2/10/23:   CONCLUSIONS  * For the indication of Nonrheumatic aortic valve stenosis, findings are  moderate AS.  * Left ventricular systolic function is hyperdynamic with an ejection  fraction of 78 % by Lundberg's biplane.  * Left ventricular chamber size is normal.  * There is no increased left ventricular wall thickness.  * Left ventricular diastolic function: Grade II diastolic dysfunction.  * Left atrial chamber is severely enlarged with a left atrial volume index  (BP MOD) of 67.92 ml/m^2.  * Right ventricular systolic function is normal with TAPSE measuring 1.88 cm  and TAPSV measuring 9.79 cm/s.  * Right ventricular chamber dimension is normal.  * Right atrial chamber size is normal.  * There is mild to moderate mitral valve regurgitation.  * There is moderate diffuse thickening (sclerosis) of the aortic valve  cusps.  * There is moderate mitral valve stenosis.  * There is moderate pulmonic regurgitation.  * There is severe tricuspid valve regurgitation.  * There is moderate aortic valve stenosis with a peak velocity of 2.65 m/s,  mean gradient of 17 mmHg, dimensionless index (VTI) of 0.64, LVOT stroke  volume index of 71.07 mL/m2, and an aortic valve area of 1.63 cm2.  * Moderate pulmonary hypertension, estimated pulmonary arterial systolic  pressure is 58 mmHg.    Comparison  * Compared to prior study from 1/21/21: Normal LV cavity size and  wall  thickness; Normal LV systolic function; Stage I diastolic dysfunction;  Severely dilated LA; Dilated RV with normal systolic function; Mild mitral  stenosis; Moderate aortic stenosis; Mild tricuspid regurgitation; Estimated  PASP of 44 mmHg. No past medical history on file. No past surgical history on file. No family history on file. Social History     Socioeconomic History    Marital status:        Prior to Admission medications    Medication Sig Start Date End Date Taking? Authorizing Provider   Multiple Vitamins-Minerals (MULTIVITAMINS/MINERALS ADULT PO) Take 1 tablet by mouth daily 10/29/19  Yes Historical Provider, MD   triamterene-hydroCHLOROthiazide (MAXZIDE-25) 37.5-25 MG per tablet Take 1 tablet by mouth daily 8/22/19  Yes Historical Provider, MD   amLODIPine-atorvastatatin (CADUET) 10-10 MG per tablet Take 10 mg by mouth 3/1/23   Historical Provider, MD   BRIMONIDINE 0.2% + TIMOLOL 0.5% PANEL Apply 1 drop to eye 3 times daily    Historical Provider, MD   Diclofenac & B6-FA-B12 3 & 46-0.4-1.1 % & MG KIT Apply 1 Dose topically 3 times daily as needed    Historical Provider, MD   montelukast (SINGULAIR) 10 MG tablet Take 10 mg by mouth nightly    Historical Provider, MD   Latanoprost 0.005 % EMUL Apply 1 drop to eye every evening    Historical Provider, MD   oxyCODONE-acetaminophen (PERCOCET) 5-325 MG per tablet Take 1 tablet by mouth every 4 hours as needed. Historical Provider, MD   famotidine (PEPCID) 20 MG tablet Take 20 mg by mouth 2 times daily    Historical Provider, MD   pregabalin (LYRICA) 50 MG capsule Take 50 mg by mouth 3 times daily. Historical Provider, MD       Allergies   Allergen Reactions    Ace Inhibitors Anaphylaxis     Other reaction(s): anaphylaxis/angioedema      Cephalexin Rash and Other (See Comments)    Gabapentin Other (See Comments)     Other reaction(s): gi distress  Nausea at 300 mg dose. Nausea at 300 mg dose.        Iodinated Contrast Media Other (See Comments)     X-Ray Dye  Other reaction(s): unknown      Iodine Other (See Comments)    Timolol      Shortness of breath       Review of Systems  Gen: No fever, chills, malaise, weight loss/gain. Heent: No headache, rhinorrhea, epistaxis, ear pain, hearing loss, sinus pain, neck pain/stiffness, sore throat. Heart: No chest pain, palpitations, BETANCUR, pnd, or orthopnea. Resp: No cough, hemoptysis, wheezing and shortness of breath. GI: No nausea, vomiting, diarrhea, constipation, melena or hematochezia. : No urinary obstruction, dysuria or hematuria. Derm: No rash, new skin lesion or pruritis. Musc/skeletal: no bone or joint complains. Vasc: No edema, cyanosis or claudication. Endo: No heat/cold intolerance, no polyuria,polydipsia or polyphagia. Neuro: No unilateral weakness, numbness, tingling. No seizures. Heme: No easy bruising or bleeding. Physical Exam:     Physical Exam:  BP (!) 135/51   Pulse 66   Temp 97.9 °F (36.6 °C) (Oral)   Resp 16   Ht 5' 2\" (1.575 m)   Wt 141 lb (64 kg)   SpO2 98%   BMI 25.79 kg/m²         Temp (24hrs), Av.7 °F (36.5 °C), Min:97.5 °F (36.4 °C), Max:97.9 °F (36.6 °C)    No intake/output data recorded. No intake/output data recorded. General:  Awake, cooperative, no distress. Head:  Normocephalic, without obvious abnormality, atraumatic. Eyes:  Conjunctivae/corneas clear, sclera anicteric, PERRL, EOMs intact. Nose: Nares normal. No drainage or sinus tenderness. Throat: Lips, mucosa, and tongue normal.    Neck: Supple, symmetrical, trachea midline, no adenopathy. Lungs:   Clear to auscultation bilaterally. Heart:  Regular rate and rhythm, S1, S2 normal, no murmur, click, rub or gallop. Abdomen: Soft, non-tender. Bowel sounds normal. No masses,  No organomegaly. Extremities: Extremities normal, atraumatic, no cyanosis or edema. Capillary refill normal.   Pulses: 2+ and symmetric all extremities.    Skin: Skin color as per ethnicity, turgor normal. No rashes or lesions   Neurologic: CNII-XII intact. No focal motor or sensory deficit.        Labs Reviewed:      Recent Results (from the past 24 hour(s))   Urinalysis    Collection Time: 03/01/23  2:59 PM   Result Value Ref Range    Color, UA YELLOW      Appearance CLOUDY      Specific Gravity, UA 1.022 1.005 - 1.030      pH, Urine 6.5 5.0 - 8.0      Protein, UA Negative NEG mg/dL    Glucose, UA Negative NEG mg/dL    Ketones, Urine Negative NEG mg/dL    Bilirubin Urine Negative NEG      Blood, Urine Negative NEG      Urobilinogen, Urine 0.2 0.2 - 1.0 EU/dL    Nitrite, Urine Negative NEG      Leukocyte Esterase, Urine Negative NEG     CBC with Auto Differential    Collection Time: 03/01/23  6:08 PM   Result Value Ref Range    WBC 5.3 4.6 - 13.2 K/uL    RBC 3.60 (L) 4.20 - 5.30 M/uL    Hemoglobin 10.7 (L) 12.0 - 16.0 g/dL    Hematocrit 34.0 (L) 35.0 - 45.0 %    MCV 94.4 78.0 - 100.0 FL    MCH 29.7 24.0 - 34.0 PG    MCHC 31.5 31.0 - 37.0 g/dL    RDW 14.8 (H) 11.6 - 14.5 %    Platelets 860 180 - 052 K/uL    MPV 11.7 9.2 - 11.8 FL    Nucleated RBCs 0.0 0  WBC    nRBC 0.00 0.00 - 0.01 K/uL    Seg Neutrophils 34 (L) 40 - 73 %    Lymphocytes 51 21 - 52 %    Monocytes 10 3 - 10 %    Eosinophils % 4 0 - 5 %    Basophils 1 0 - 2 %    Immature Granulocytes 0 0.0 - 0.5 %    Segs Absolute 1.8 1.8 - 8.0 K/UL    Absolute Lymph # 2.7 0.9 - 3.6 K/UL    Absolute Mono # 0.5 0.05 - 1.2 K/UL    Absolute Eos # 0.2 0.0 - 0.4 K/UL    Basophils Absolute 0.1 0.0 - 0.1 K/UL    Absolute Immature Granulocyte 0.0 0.00 - 0.04 K/UL    Differential Type AUTOMATED     Basic Metabolic Panel    Collection Time: 03/01/23  6:08 PM   Result Value Ref Range    Sodium 141 136 - 145 mmol/L    Potassium 4.7 3.5 - 5.5 mmol/L    Chloride 115 (H) 100 - 111 mmol/L    CO2 22 21 - 32 mmol/L    Anion Gap 4 3.0 - 18 mmol/L    Glucose 85 74 - 99 mg/dL    BUN 31 (H) 7.0 - 18 MG/DL    Creatinine 0.91 0.6 - 1.3 MG/DL    Bun/Cre Ratio 34 (H) 12 - 20      Est, Glom Filt Rate 59 (L) >60 ml/min/1.73m2    Calcium 9.4 8.5 - 10.1 MG/DL       Procedures/imaging: see electronic medical records for all procedures/Xrays and details which were not copied into this note but were reviewed prior to creation of Plan      CC: Liudmila Mcneill MD

## 2023-03-02 NOTE — ANESTHESIA PRE PROCEDURE
Department of Anesthesiology  Preprocedure Note       Name:  Bob Jonas   Age:  80 y.o.  :  1931                                          MRN:  [id number]         Date:  3/2/2023      Surgeon: Souleymane Banks):  Gume Bautista DPM    Procedure: Procedure(s):  RIGHT FOOT 2ND TOE AMPUTATION PT IN ROOM #328    Medications prior to admission:   Prior to Admission medications    Medication Sig Start Date End Date Taking? Authorizing Provider   Multiple Vitamins-Minerals (MULTIVITAMINS/MINERALS ADULT PO) Take 1 tablet by mouth daily 10/29/19  Yes Historical Provider, MD   triamterene-hydroCHLOROthiazide (MAXZIDE-25) 37.5-25 MG per tablet Take 1 tablet by mouth daily 19  Yes Historical Provider, MD   amLODIPine-atorvastatatin (CADUET) 10-10 MG per tablet Take 10 mg by mouth 3/1/23   Historical Provider, MD   BRIMONIDINE 0.2% + TIMOLOL 0.5% PANEL Apply 1 drop to eye 3 times daily    Historical Provider, MD   Diclofenac & B6-FA-B12 3 & 46-0.4-1.1 % & MG KIT Apply 1 Dose topically 3 times daily as needed    Historical Provider, MD   montelukast (SINGULAIR) 10 MG tablet Take 10 mg by mouth nightly    Historical Provider, MD   Latanoprost 0.005 % EMUL Apply 1 drop to eye every evening    Historical Provider, MD   oxyCODONE-acetaminophen (PERCOCET) 5-325 MG per tablet Take 1 tablet by mouth every 4 hours as needed. Historical Provider, MD   famotidine (PEPCID) 20 MG tablet Take 20 mg by mouth 2 times daily    Historical Provider, MD   pregabalin (LYRICA) 50 MG capsule Take 50 mg by mouth 3 times daily.     Historical Provider, MD       Current medications:    Current Facility-Administered Medications   Medication Dose Route Frequency Provider Last Rate Last Admin    BRIMONIDINE 0.2% + TIMOLOL 0.5% PANEL 1 drop  1 drop Ophthalmic TID Liliya Mathews MD        famotidine (PEPCID) tablet 20 mg  20 mg Oral BID Liliya Mathews MD        Latanoprost 0.005 % EMUL 1 drop  1 drop Ophthalmic QPM Liliya MD Reid        amLODIPine (NORVASC) tablet 10 mg  10 mg Oral Daily Liliya Mathews MD        And    atorvastatin (LIPITOR) tablet 10 mg  10 mg Oral Daily Liliya Mathews MD        Vancomycin-Pharmacy to dose   Other RX Placeholder Page Cedeño, PA        vancomycin (VANCOCIN) 1,000 mg in sodium chloride 0.9 % 250 mL (vial-mate) IVPB  1,000 mg IntraVENous Q24H Page Cedeño, PA        sodium chloride flush 0.9 % injection 5-40 mL  5-40 mL IntraVENous 2 times per day Liliya Mathews MD   5 mL at 03/01/23 2316    sodium chloride flush 0.9 % injection 5-40 mL  5-40 mL IntraVENous PRN Liliya Mathews MD        0.9 % sodium chloride infusion   IntraVENous PRN Liliya Mathews MD        ondansetron (ZOFRAN-ODT) disintegrating tablet 4 mg  4 mg Oral Q8H PRN Liliya Mathews MD        Or    ondansetron (ZOFRAN) injection 4 mg  4 mg IntraVENous Q6H PRN Liliya Mathews MD        polyethylene glycol (GLYCOLAX) packet 17 g  17 g Oral Daily PRN Kiran Gongora MD        acetaminophen (TYLENOL) tablet 650 mg  650 mg Oral Q4H PRN Liliya Mathews MD        oxyCODONE (ROXICODONE) immediate release tablet 5 mg  5 mg Oral Q4H PRN Liliya Mathews MD        Or    oxyCODONE (ROXICODONE) immediate release tablet 10 mg  10 mg Oral Q4H PRN Liliya Mathews MD        morphine (PF) injection 2 mg  2 mg IntraVENous Q3H PRN iKran Gongora MD           Allergies: Allergies   Allergen Reactions    Ace Inhibitors Anaphylaxis     Other reaction(s): anaphylaxis/angioedema      Cephalexin Rash and Other (See Comments)    Gabapentin Other (See Comments)     Other reaction(s): gi distress  Nausea at 300 mg dose. Nausea at 300 mg dose.        Iodinated Contrast Media Other (See Comments)     X-Ray Dye  Other reaction(s): unknown      Iodine Other (See Comments)    Timolol      Shortness of breath       Problem List:    Patient Active Problem List   Diagnosis Code    [facility] second [facility] right [facility] M86.9    Glaucoma H40.9    Hypertension I10    Aortic valve stenosis, nonrheumatic I35.0       Past Medical History:  History reviewed. No pertinent past medical history. Past Surgical History:  History reviewed. No pertinent surgical history. Social History:    Social History     Tobacco Use    Smoking status: Not on file    Smokeless tobacco: Not on file   Substance Use Topics    Alcohol use: Not on file                                Counseling given: Not Answered      Vital Signs (Current):   Vitals:    03/01/23 2023 03/01/23 2346 03/02/23 0332 03/02/23 0726   BP: (!) 135/51 (!) 125/42 (!) 126/50 (!) 140/57   Pulse: 66 61 55 56   Resp: 16 16 16 16   Temp: 97.9 °F (36.6 °C) 98.1 °F (36.7 °C) 98.2 °F (36.8 °C) 98.5 °F (36.9 °C)   TempSrc: Oral Oral Oral Oral   SpO2: 98% 98% 98% 100%   Weight: 141 lb (64 kg)      Height: 5' 2\" (1.575 m)                                                 BP Readings from Last 3 Encounters:   03/02/23 (!) 140/57       NPO Status: Time of last liquid consumption: 0000                        Time of last solid consumption: 0000                        Date of last liquid consumption: 03/01/23                        Date of last solid food consumption: 03/01/23    BMI:   Wt Readings from Last 3 Encounters:   03/01/23 141 lb (64 kg)     Body mass index is 25.79 kg/m².     CBC:   Lab Results   Component Value Date/Time    WBC 6.1 03/02/2023 03:27 AM    RBC 3.33 03/02/2023 03:27 AM    HGB 10.2 03/02/2023 03:27 AM    HCT 32.5 03/02/2023 03:27 AM    MCV 97.6 03/02/2023 03:27 AM    RDW 15.2 03/02/2023 03:27 AM     03/02/2023 03:27 AM       CMP:   Lab Results   Component Value Date/Time     03/02/2023 03:27 AM    K 4.3 03/02/2023 03:27 AM     03/02/2023 03:27 AM    CO2 22 03/02/2023 03:27 AM    BUN 27 03/02/2023 03:27 AM    CREATININE 0.96 03/02/2023 03:27 AM    GFRAA >60 05/29/2019 01:56 PM    LABGLOM 56 03/02/2023 03:27 AM    GLUCOSE 84 03/02/2023 03:27 AM    CALCIUM 9.0 03/02/2023 03:27 AM       POC Tests: No results for input(s): POCGLU, POCNA, POCK, POCCL, POCBUN, POCHEMO, POCHCT in the last 72 hours. Coags:   Lab Results   Component Value Date/Time    PROTIME 13.6 03/02/2023 03:27 AM    INR 1.0 03/02/2023 03:27 AM       HCG (If Applicable): No results found for: PREGTESTUR, PREGSERUM, HCG, HCGQUANT     ABGs: No results found for: PHART, PO2ART, BEN8LHF, QZM8GYO, BEART, F7VIQQKU     Type & Screen (If Applicable):  No results found for: LABABO, LABRH    Drug/Infectious Status (If Applicable):  No results found for: HIV, HEPCAB    COVID-19 Screening (If Applicable): No results found for: COVID19        Anesthesia Evaluation  Patient summary reviewed and Nursing notes reviewed no history of anesthetic complications:   Airway: Mallampati: I  TM distance: >3 FB   Neck ROM: full  Mouth opening: > = 3 FB   Dental:    (+) edentulous      Pulmonary:Negative Pulmonary ROS                              Cardiovascular:  Exercise tolerance: good (>4 METS),   (+) hypertension:, hyperlipidemia                  Neuro/Psych:   Negative Neuro/Psych ROS              GI/Hepatic/Renal: Neg GI/Hepatic/Renal ROS            Endo/Other: Negative Endo/Other ROS                    Abdominal:             Vascular: negative vascular ROS. Other Findings:           Anesthesia Plan      MAC     ASA 2       Induction: intravenous. Anesthetic plan and risks discussed with patient. Plan discussed with CRNA.     Attending anesthesiologist reviewed and agrees with Preprocedure content        propofol only for local by surgeon        Raina Kaye MD   3/2/2023

## 2023-03-02 NOTE — PERIOP NOTE
TRANSFER - OUT REPORT:    Verbal report given to GELY SARGENT on Marta Juarez  being transferred to [place] for routine progression of patient care       Report consisted of patient's Situation, Background, Assessment and   Recommendations(SBAR). Information from the following report(s) Nurse Handoff Report was reviewed with the receiving nurse. Lyman Assessment: No data recorded  Lines:   Peripheral IV 03/01/23 Left; Anterior Forearm (Active)   Site Assessment Clean, dry & intact 03/02/23 0940   Line Status Infusing 03/02/23 0940   Phlebitis Assessment No symptoms 03/02/23 0940   Infiltration Assessment 0 03/02/23 0940   Dressing Status Clean, dry & intact 03/02/23 0940   Dressing Type Transparent 03/02/23 0940        Opportunity for questions and clarification was provided.       Patient transported with:  Registered Nurse

## 2023-03-02 NOTE — PROGRESS NOTES
Case Management Assessment  Initial Evaluation    Date/Time of Evaluation: 3/2/2023 4:09 PM  Assessment Completed by: Kayla Parks    If patient is discharged prior to next notation, then this note serves as note for discharge by case management. Patient Name: Guerda Garnica                   YOB: 1931  Diagnosis: Osteoarthritis of toe [M19.079]  Osteomyelitis of second toe of right foot [facility]) [M86.9]                   Date / Time: 3/1/2023  4:17 PM    Patient Admission Status: Inpatient   Readmission Risk (Low < 19, Mod (19-27), High > 27): Readmission Risk Score: 9.6    Current PCP: Annemarie Nelson MD  PCP verified by CM? Yes    Chart Reviewed: Yes      History Provided by: Patient, Child/Family  Patient Orientation: Alert and Oriented    Patient Cognition: Alert    Hospitalization in the last 30 days (Readmission):  No    If yes, Readmission Assessment in CM Navigator will be completed. Advance Directives:      Code Status: Full Code   Patient's Primary Decision Maker is: Legal Next of Kin    Primary Decision Maker: Oleksandr Love Sulma) - Child - [phone]    Secondary Decision Maker: Georgina Belinda (Daughter) - Child - [phone]    Discharge Planning:    Patient lives with: Children Type of Home: House  Primary Care Giver: Self  Patient Support Systems include: Children   Current Financial resources: None  Current community resources: None  Current services prior to admission: None            Current DME:              Type of Home Care services:       ADLS  Prior functional level: Independent in ADLs/IADLs  Current functional level: Independent in ADLs/IADLs    PT AM-PAC:   /24  OT AM-PAC:   /24    Family can provide assistance at DC: Yes  Would you like Case Management to discuss the discharge plan with any other family members/significant others, and if so, who?  Yes (daughter, Abercrombie Rape)  Plans to Return to Present Housing: Yes  Other Identified Issues/Barriers to RETURNING to current housing: pending clinical progression   Potential Assistance needed at discharge: Home Care            Potential DME:    Patient expects to discharge to: [facility] for transportation at discharge: Other (see comment) (Family)    Financial    Payor: Sim [place] / Plan: [employer] GOLD [employer]US HMO / Product Type: *No Product type* /     Does insurance require precert for SNF: Yes    Potential assistance Purchasing Medications: No  Meds-to-Beds request: Yes      [address] [facility], [address] [phone] Bg Dietz [phone]  [facility]  Phone: [phone] Fax: [phone]      Notes:    Factors facilitating achievement of predicted outcomes: Family support    Barriers to discharge: None    Additional Case Management Notes:     CM met with pt and her daughter, Yamile Mackenzie. Pt has access to a RW and cane, but ambulates independently. CM discussed care transition options to include [facility] and rehab. Family has indicated pt will return home and if [facility] is needed they would like to use [facility]. Noted ID is following. Please consider ordering therapy services with WB status to assist with identifying care transition needs. 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 Freedom of choice list with basic dialogue that supports the patient's individualized plan of care/goals and shares the quality data associated with the providers was provided to: Patient Representative   Patient Representative Name: daughter, Yamile Mackenzie     The Patient and/or Patient Representative Agree with the Discharge Plan?  Yes    [address]  Case Management Department

## 2023-03-02 NOTE — PROGRESS NOTES
[facility] Pharmacokinetic Monitoring Service - Vancomycin     Mulu Watson is a 80 y.o. female starting on vancomycin therapy for bone and joint infection. Pharmacy consulted by Danielle Lemons [facility] for monitoring and adjustment. Target Concentration: Goal AUC/BRENT 400-600 mg*hr/L    Additional Antimicrobials: none    Pertinent Laboratory Values: Wt Readings from Last 1 Encounters:   03/01/23 141 lb (64 kg)     Temp Readings from Last 1 Encounters:   03/01/23 97.9 °F (36.6 °C) (Oral)     Estimated Creatinine Clearance: 35 mL/min (based on SCr of 0.91 mg/dL). Recent Labs     03/01/23  1808   CREATININE 0.91   WBC 5.3     Plan:  Dosing recommendations based on Bayesian software  Vancomycin 1250 mg IVPB x 1 in ER @ 1468. Maintenance dose of Vancomycin 1000 mg IVPB q24h.    Anticipated AUC of 514 mg/l.h and trough concentration of 16.1 mg/l at steady state  Renal labs as indicated   Pharmacy will continue to monitor patient and adjust therapy as indicated    Rafaela Zarate RPH, BCPS   3/1/2023 8:51 PM

## 2023-03-02 NOTE — PLAN OF CARE
Problem: Safety - Adult  Goal: Able to ambulate independently  Description: Able to ambulate independently  3/2/2023 1446 by Madeleine Garcias RN  Outcome: Progressing  3/2/2023 1446 by Madeleine Garcias RN  Outcome: Progressing     Problem: Pain  Goal: Verbalizes/displays adequate comfort level or baseline comfort level  Outcome: Progressing     Problem: ABCDS Injury Assessment  Goal: Absence of physical injury  3/2/2023 1446 by Madeleine Garcias RN  Outcome: Progressing  3/2/2023 1446 by Madeleine Garcias RN  Outcome: Progressing

## 2023-03-02 NOTE — PLAN OF CARE
Patient is s/p right foot 2nd digit amputation secondary to osteomyelitis    Patient can resume regular diet  Elevate surgical extremity  Patient can be weight bearing as tolerated in a surgical shoe  Specimen sent for pathology, cultures sent for C&S, results pending.  Continue with scheduled antibiotics, alter as needed per ID  Confident all infected soft tissue and bone was resected  Dressings: xeroform, 4x4 gauze, kerlix, ACE.  Please change/reinforce dressing if needed for strike through.  Patient is stable for DC from podiatric standpoint once antibiotic regimen has been finalized.  Will continue to monitor while the patient is in house  Please contact with any questions and/or concerns

## 2023-03-02 NOTE — ACP (ADVANCE CARE PLANNING)
Advance Care Planning     General Advance Care Planning (ACP) Conversation    Date of Conversation: 3/2/2023    Conducted with: Patient with Decision Making Capacity, Cali Carmona NP (Palliative) and this writer. Healthcare Decision Maker:    Patient has an advance medical directive (AMD) that has been placed on her chart. The AMD will need to be scanned in to the EMR. Primary Decision Maker: Nicole Nguyenn) - Child - [phone]  Secondary Decision Maker: Janeen Phipps (Daughter) - Child - [phone]      Content/Action Overview: This writer, along with Cali Carmona NP, with the Palliative Care team; visited with patient today to offer support and to also discuss healthcare decision makers. Patient was laying in bed and alert and oriented x 4. Patient reported that she resides with her daughter Mary and her granddaughter (Anabel Domingo). Patient reported to being independent with all of her ADLs and IADLs. She also reported that she does not require any assisted devices for mobility purposes. She is able to ambulate on her own, freely. Patient reported that she stopped driving 2 years ago; on her own decision. She stated that her daughter Mary and granddaughter assist her as needed. Patient was then asked about healthcare decision makers, that she would like to appoint. Patient stated that she had already completed a document (AMD) that was brought in to [facility] and placed on her chart. The Palliative Care team verified that the document was brought in and now needs to be scanned in to the EMR. Her daughter Mary is named as the primary decision maker and her other daughter Krupa Price is named as the secondary. Patient has two other children: a son Jennifer Puente) and another son Gurdeep Maldonado). The Palliative Care team will follow up with patient regarding goals of care moving forward. At this time, patient will remain a FULL CODE WITH FULL INTERVENTIONS.       Length of Voluntary ACP Conversation in minutes:  30 minutes        Florentino Paige., MSW  Palliative Care   #[phone]

## 2023-03-02 NOTE — CONSULTS
[facility]  (A Division of [address] [place] [address])                                                           Date of Admission: 3/1/2023   Date of Note: 3/2/2023  Reason for Consult: foot OM  Referring MD: Dr Inocente Nesbitt    Thank you for involving me in the care of this patient. Please do not hesitate to contact me on the above number if question or concern. Current Antimicrobials:    Prior Antimicrobials:  Levofloxacin and Vancomycin 3/1 to date   NA   Allergy to antibiotics: keflex     Assessment:     R 2nd toe chronic OM--S/P ampuation 3/2/23  --culture and pathology 3/2: Pending  Psoriatric arthritis  History of ampuation Left 2nd toe        Recommendation -- ID related:     FU OR cultures  Cont current abx for now  If OM resected completely, no need for further abx. If Podiatry confirms or biopsy clear     HPI:       Dominique Ro is a 80 y.o. female who presented to ED c/o discoloration to the right second toe. States it has been there for many months. She is followed and plans for surgery, which is done this am.    Denies fevers or body aches . No issue with current antibiotics --levofloxacin and vancomycin. Active Hospital Problems    Diagnosis Date Noted    Osteomyelitis of second toe of right foot ([facility] [M86.9] 03/01/2023     Priority: Medium    Glaucoma [H40.9] 03/01/2023     Priority: Medium    Hypertension [I10] 03/01/2023     Priority: Medium    Aortic valve stenosis, nonrheumatic [I35.0] 03/01/2023     Priority: Medium     History reviewed. No pertinent past medical history. History reviewed. No pertinent surgical history. History reviewed. No pertinent family history. Social History     Socioeconomic History    Marital status:       Spouse name: Not on file    Number of children: Not on file    Years of education: Not on file    Highest education level: Not on file   Occupational History    Not on file   Tobacco Use    Smoking status: Not on file Smokeless tobacco: Not on file   Substance and Sexual Activity    Alcohol use: Not on file    Drug use: Not on file    Sexual activity: Not on file   Other Topics Concern    Not on file   Social History Narrative    Not on file     Social Determinants of Health     Financial Resource Strain: Not on file   Food Insecurity: Not on file   Transportation Needs: Not on file   Physical Activity: Not on file   Stress: Not on file   Social Connections: Not on file   Intimate Partner Violence: Not on file   Housing Stability: Not on file       Medications:  Current Facility-Administered Medications   Medication Dose Route Frequency Provider Last Rate Last Admin    latanoprost (XALATAN) 0.005 % ophthalmic solution 1 drop  1 drop Both Eyes QPM Anna Whittaker DPM        amLODIPine (NORVASC) tablet 10 mg  10 mg Oral Daily Anna Whittaker DPM   10 mg at 03/02/23 1127    And    atorvastatin (LIPITOR) tablet 10 mg  10 mg Oral Daily Anna Whittaker DPM   10 mg at 03/02/23 1127    oxyCODONE (ROXICODONE) immediate release tablet 5 mg  5 mg Oral Q4H PRN Anna Whittaker DPM        Vancomycin Random Level 03/03/23 with AM labs   Other Once Tylene Manjit, PA        levoFLOXacin (LEVAQUIN) 250 MG/50ML infusion 250 mg  250 mg IntraVENous Q24H Katie Hollins MD 50 mL/hr at 03/02/23 1511 250 mg at 03/02/23 1511    enoxaparin Sodium (LOVENOX) injection 30 mg  30 mg SubCUTAneous Daily Katie Hollins MD   30 mg at 03/02/23 1513    brimonidine (ALPHAGAN) 0.2 % ophthalmic solution 1 drop  1 drop Both Eyes 3 times per day Katie Hollins MD   1 drop at 03/02/23 1456    dorzolamide (TRUSOPT) 2 % ophthalmic solution 1 drop  1 drop Both Eyes TID Katie Hollins MD   1 drop at 03/02/23 1456    [START ON 3/3/2023] famotidine (PEPCID) tablet 20 mg  20 mg Oral Daily Liliya Mathews MD        Vancomycin-Pharmacy to dose   Other RX Placeholder Anna Whittaker DPM        vancomycin (VANCOCIN) 1,000 mg in sodium chloride 0.9 % 250 mL (vial-mate) IVPB  1,000 mg IntraVENous Q24H Fredderick Ridge, DPM        sodium chloride flush 0.9 % injection 5-40 mL  5-40 mL IntraVENous 2 times per day Fredderick Ridge, DPM   5 mL at 23 2316    sodium chloride flush 0.9 % injection 5-40 mL  5-40 mL IntraVENous PRN Fredderick Ridge, DPM        0.9 % sodium chloride infusion   IntraVENous PRN Fredderick Ridge, DPM        ondansetron (ZOFRAN-ODT) disintegrating tablet 4 mg  4 mg Oral Q8H PRN Fredderick Ridge, DPM        Or    ondansetron [facility]) injection 4 mg  4 mg IntraVENous Q6H PRN Fredderick Ridge, DPM        polyethylene glycol (GLYCOLAX) packet 17 g  17 g Oral Daily PRN Freioanaerick Ridge, DPM        acetaminophen (TYLENOL) tablet 650 mg  650 mg Oral Q4H PRN [facility] Hospital[facility] Ridge, DPM        morphine (PF) injection 2 mg  2 mg IntraVENous Q3H PRN [facility], DPM            Review of Systems     Negative Unless BOLDED     General: fevers, chills, myalgias, arthralgias, unexplained weight loss, malaise, fatigue. HEENT:  headaches, recent URI       Objective:       BP (!) 110/53   Pulse 56   Temp 99.8 °F (37.7 °C) (Oral)   Resp 18   Ht 5' 2\" (1.575 m)   Wt 141 lb (64 kg)   SpO2 98%   BMI 25.79 kg/m²   Temp (24hrs), Av.2 °F (36.8 °C), Min:97.1 °F (36.2 °C), Max:99.8 °F (37.7 °C)      Lines: PIV    General:   WD WN , awake alert and oriented   Skin:   no rashes or skin lesions noted on limited exam   HEENT:  Normocephalic, atraumatic, EOMI, no scleral icterus or pallor; no conjunctival hemmohage       Lungs:   non-labored, bilaterally clear to aspiration   Heart:  RRR, s1 and s2; no murmurs rubs or gallops   Abdomen:  soft, non-distended, active bowel sounds. Non-tender   Genitourinary:  deferred   Extremities:   no clubbing, cyanosis; no joint effusions or swelling; ; muscle mass appropriate for age   Neurologic:  No gross focal sensory abnormalities;  Cranial nerves intact   Psychiatric:   appropriate and interactive.         Labs: Results: Chemistry Recent Labs     03/01/23  1808 03/02/23  0327    142   K 4.7 4.3   * 115*   CO2 22 22   BUN 31* 27*      CBC w/Diff Recent Labs     03/01/23  1808 03/02/23  0327   WBC 5.3 6.1   RBC 3.60* 3.33*   HGB 10.7* 10.2*   HCT 34.0* 32.5*    160            No results found for: SDES No components found for: CULT     Results       Procedure Component Value Units Date/Time    Culture, Anaerobic [[id number]] Collected: 03/02/23 0905    Order Status: Sent Specimen: Swab from Foot Updated: 03/02/23 1445    Culture, Wound Aerobic Only [[id number]] Collected: 03/02/23 0905    Order Status: Sent Specimen: Swab from Foot Updated: 03/02/23 1445          Imaging: All imaging reviewed from Admission to present as per radiology interpretation in [address] [place] [address] ARLETH Brito MD  [facility])  Office #:     [phone]  [phone]   Office Fax: [phone]

## 2023-03-02 NOTE — PROGRESS NOTES
Hospitalist Progress Note-critical care note     Patient: Gabriela Costello MRN: [id number]  CSN: [id number]    YOB: 1931  Age: 80 y.o. Sex: female    DOA: 3/1/2023 LOS:  LOS: 1 day            Chief complaint: OM, glaucoma, htn, avs     Assessment/Plan         Active Hospital Problems    Diagnosis Date Noted    Osteomyelitis of second toe of right foot ([facility] [address] [M86.9] 03/01/2023     Priority: Medium    Glaucoma [H40.9] 03/01/2023     Priority: Medium    Hypertension [I10] 03/01/2023     Priority: Medium    Aortic valve stenosis, nonrheumatic [I35.0] 03/01/2023     Priority: Medium      Admitted for osteomyelitis right second toe for amputation. Right second toe osteomyelitis   Done AM, continue iv abx, id consult -waiting for surgical margin ,pain control   Pt/ot   Vanc and levaquin for abx -till id recommendation     Chronic glaucoma  Continue home eye drop     Hypertension  Continue home medication    Aortic valve stenosis, moderate  Continue seeing cardiologist as out-pt     Psoriatic arthritis    Previous amputation of left toe    Fall precaution     Advanced age   Palliative care consult for code status discussion      Subjective I feel fine, no pain now     Daughter and son were at the bedside    All questions have been answered. 35 total min's spent on patient care including >50% on counseling/coordinating care. Discussed the above assessments. also discussed labs, medications and hospital course    Disposition :tbd,   Review of systems:    General: No fevers or chills. Cardiovascular: No chest pain or pressure. No palpitations. Pulmonary: No shortness of breath. Gastrointestinal: No nausea, vomiting.      Vital signs/Intake and Output:  Visit Vitals  BP (!) 158/60   Pulse 56   Temp 98.4 °F (36.9 °C) (Oral)   Resp 15   Ht 5' 2\" (1.575 m)   Wt 141 lb (64 kg)   SpO2 100%   BMI 25.79 kg/m²     Current Shift:  03/02 0701 - 03/02 1900  In: 700 [I.V.:700]  Out: 355 [Urine:350]  Last three shifts:  No intake/output data recorded. Physical Exam:  General: WD, WN. Alert, cooperative, no acute distress    HEENT: NC, Atraumatic. PERRLA, anicteric sclerae. Lungs: CTA Bilaterally. No Wheezing/Rhonchi/Rales. Heart:  Regular  rhythm,  +murmur, No Rubs, No Gallops  Abdomen: Soft, Non distended, Non tender. +Bowel sounds,   Extremities: No c/c/e rt foot wrapped with ace   Psych:   Not anxious or agitated. Neurologic:  No acute neurological deficit. Labs: Results:       Chemistry Recent Labs     03/01/23 1808 03/02/23 0327    142   K 4.7 4.3   * 115*   CO2 22 22   BUN 31* 27*      CBC w/Diff Recent Labs     03/01/23 1808 03/02/23 0327   WBC 5.3 6.1   RBC 3.60* 3.33*   HGB 10.7* 10.2*   HCT 34.0* 32.5*    160      Cardiac Enzymes No results for input(s): CPK, MEGHNA in the last 72 hours. Invalid input(s): CKRMB, [facility]   Coagulation Recent Labs     03/02/23 0327   INR 1.0       Lipid Panel No results found for: CHOL, CHOLPOCT, CHOLX, CHLST, CHOLV, 220083, HDL, HDLC, LDL, LDLC, 195472, VLDLC, VLDL, TGLX, TRIGL   BNP Invalid input(s): BNPP   Liver Enzymes No results for input(s): TP, ALB in the last 72 hours. Invalid input(s): TBIL, AP, SGOT, GPT, DBIL   Thyroid Studies No results found for: T4, TSH     Procedures/imaging: see electronic medical records for all procedures/Xrays and details which were not copied into this note but were reviewed prior to creation of Plan    No results found.     Dmitriy Salvador MD

## 2023-03-02 NOTE — PROGRESS NOTES
3/2/2023 PT note: consult received and chart reviewed. Evaluation attempted. Pt recently arrived to room post op amputation R 2nd toe. Pt requires surgical shoe (as per Podiatrist orders)  and same not in room at this time. Also pt food tray arrived and pt assisted with set up for eating. Nurse Zayda notified of need for post op shoe and will obtain same. Will f/u at later time as pt schedule allows for PT evaluation.  Thank you for this referral.   Valery, PT

## 2023-03-02 NOTE — CONSULTS
Palliative Medicine Consult    Patient Name: Johnna Arroyo  YOB: 1931    Date of Initial Consult: 3/2/2023  Reason for Consult: Goals of care discussions  Requesting Provider: Dr. Jovita Pierre  Primary Care Physician: Ricardo Patel MD      SUMMARY:   Johnna Arroyo is a 80 y.o. with a past history of hypertension, aortic valve stenosis, psoriatic, and glaucoma, who was admitted on 3/1/2023 from home with a diagnosis of osteomyelitis of right second toe requiring amputation. Current medical issues leading to Palliative Medicine involvement include: goals of care discussions in the setting of advanced age with chronic comorbidities. PALLIATIVE DIAGNOSES:   Goals of care discussions/advance care planning  Osteomyelitis of right second toe requiring amputation  Psoriatic arthritis  Advanced age/debility        PLAN:   Goals of care discussions/advance care planning: Palliative medicine team including  KORTNEY Brenner and TEE met with patient at patient's bedside. Patient is awake, alert, and oriented x4. She is very pleasant and engaged in goals of care conversations. Introduced the role as palliative medicine. Patient states she is , has 4 children, has an advance medical directive naming her daughter Asher Holt as primary medical power of  and daughter Amaury Jeffrey as secondary medical power of . This was found on paper chart, awaiting scanning into EMR. Patient is status post right foot second toe amputation, admitted she would appreciate some rest.  We will follow-up tomorrow for further goals of care discussions. At this time patient is a full code with full interventions per chart review. Osteomyelitis of right second toe requiring amputation: Patient is status post amputation, patient is looking forward to going home. Pain reportedly controlled. Psoriatic arthritis: Patient is on Humira every Friday.   Advanced age/debility: Patient lives with her daughter Rosanna Nguyễn, and her granddaughter Shaye. Prior to admission she reports being independent in activities of daily living, ambulates without assistive devices. Stopped driving a couple years ago due to personal choice. She still does her own yard work, and cooks. She appears quite robust for her age. Initial consult note routed to primary continuity provider  Communicated plan of care with: Palliative IDT       GOALS OF CARE / TREATMENT PREFERENCES:   [====Goals of Care====]  GOALS OF CARE: Full code with full interventions         TREATMENT PREFERENCES:   Code Status: Full Code    Advance Care Planning:  Demographics 3/2/2023   Marital Status                    The palliative care team has discussed with patient / health care proxy about goals of care / treatment preferences for patient.  [====Goals of Care====]         HISTORY:     History obtained from: Patient, chart    CHIEF COMPLAINT: Right foot second toe infection, status post amputation    HPI/SUBJECTIVE:    The patient is:   [x] Verbal and participatory  [] Non-participatory due to:   Oriented x4, engaged in goals of care conversations    Clinical Pain Assessment (nonverbal scale for severity on nonverbal patients):                FUNCTIONAL ASSESSMENT:     Palliative Performance Scale (PPS):60          PSYCHOSOCIAL/SPIRITUAL SCREENING:     Advance Care Planning:  Demographics 3/2/2023   Marital Status         Any spiritual / [employer] concerns:  [] Yes /  [x] No    Caregiver Burnout:  [] Yes /  [] No /  [x] No Caregiver Present      Anticipatory grief assessment:   [x] Normal  / [] Maladaptive          REVIEW OF SYSTEMS:     Positive and pertinent negative findings in ROS are noted above in HPI. The following systems were [x] reviewed / [] unable to be reviewed as noted in HPI  Other findings are noted below.   Systems: constitutional, ears/nose/mouth/throat, respiratory, gastrointestinal, genitourinary, musculoskeletal, integumentary, neurologic, psychiatric, endocrine. Positive findings noted below. Modified ESAS Completed by: provider                                            PHYSICAL EXAM:     From RN flowsheet:  Wt Readings from Last 3 Encounters:   03/01/23 141 lb (64 kg)     Blood pressure (!) 110/53, pulse 56, temperature 99.8 °F (37.7 °C), temperature source Oral, resp. rate 18, height 5' 2\" (1.575 m), weight 141 lb (64 kg), SpO2 98 %. Constitutional: Awake, alert, no acute distress, elderly, appears quite robust for her age  Eyes: pupils equal, anicteric  ENMT: no nasal discharge, moist mucous membranes  Cardiovascular: regular rhythm, distal pulses intact  Respiratory: breathing not labored, symmetric, on room air  Gastrointestinal: soft non-tender  Musculoskeletal: no deformity, no tenderness to palpation  Skin: warm, dry, right foot second toe amputation, dressing intact  Neurologic: following commands, moving all extremities, oriented x4  Psychiatric: full affect, no hallucinations         HISTORY:     Principal Problem:    Osteomyelitis of second toe of right foot (HCC)  Active Problems:    Glaucoma    Hypertension    Aortic valve stenosis, nonrheumatic  Resolved Problems:    Osteoarthritis of toe    History reviewed. No pertinent past medical history. History reviewed. No pertinent surgical history. History reviewed. No pertinent family history. History reviewed, no pertinent family history. Social History     Tobacco Use    Smoking status: Not on file    Smokeless tobacco: Not on file   Substance Use Topics    Alcohol use: Not on file     Allergies   Allergen Reactions    Ace Inhibitors Anaphylaxis     Other reaction(s): anaphylaxis/angioedema      Cephalexin Rash and Other (See Comments)    Gabapentin Other (See Comments)     Other reaction(s): gi distress  Nausea at 300 mg dose. Nausea at 300 mg dose.        Iodinated Contrast Media Other (See Comments)     X-Ray Dye  Other reaction(s): unknown      Iodine Other (See Comments)    Timolol      Shortness of breath      Current Facility-Administered Medications   Medication Dose Route Frequency    latanoprost (XALATAN) 0.005 % ophthalmic solution 1 drop  1 drop Both Eyes QPM    amLODIPine (NORVASC) tablet 10 mg  10 mg Oral Daily    And    atorvastatin (LIPITOR) tablet 10 mg  10 mg Oral Daily    oxyCODONE (ROXICODONE) immediate release tablet 5 mg  5 mg Oral Q4H PRN    Vancomycin Random Level 03/03/23 with AM labs   Other Once    levoFLOXacin (LEVAQUIN) 250 MG/50ML infusion 250 mg  250 mg IntraVENous Q24H    enoxaparin Sodium (LOVENOX) injection 30 mg  30 mg SubCUTAneous Daily    brimonidine (ALPHAGAN) 0.2 % ophthalmic solution 1 drop  1 drop Both Eyes 3 times per day    dorzolamide (TRUSOPT) 2 % ophthalmic solution 1 drop  1 drop Both Eyes TID    [START ON 3/3/2023] famotidine (PEPCID) tablet 20 mg  20 mg Oral Daily    Vancomycin-Pharmacy to dose   Other RX Placeholder    vancomycin (VANCOCIN) 1,000 mg in sodium chloride 0.9 % 250 mL (vial-mate) IVPB  1,000 mg IntraVENous Q24H    sodium chloride flush 0.9 % injection 5-40 mL  5-40 mL IntraVENous 2 times per day    sodium chloride flush 0.9 % injection 5-40 mL  5-40 mL IntraVENous PRN    0.9 % sodium chloride infusion   IntraVENous PRN    ondansetron (ZOFRAN-ODT) disintegrating tablet 4 mg  4 mg Oral Q8H PRN    Or    ondansetron (ZOFRAN) injection 4 mg  4 mg IntraVENous Q6H PRN    polyethylene glycol (GLYCOLAX) packet 17 g  17 g Oral Daily PRN    acetaminophen (TYLENOL) tablet 650 mg  650 mg Oral Q4H PRN    morphine (PF) injection 2 mg  2 mg IntraVENous Q3H PRN          LAB AND IMAGING FINDINGS:     Lab Results   Component Value Date/Time    WBC 6.1 03/02/2023 03:27 AM    HGB 10.2 03/02/2023 03:27 AM     03/02/2023 03:27 AM     Lab Results   Component Value Date/Time     03/02/2023 03:27 AM    K 4.3 03/02/2023 03:27 AM     03/02/2023 03:27 AM    CO2 22 03/02/2023 03:27 AM    BUN 27 03/02/2023 03:27 AM      No results found for: TP, ALB, GLOB, GGT  Lab Results   Component Value Date/Time    INR 1.0 03/02/2023 03:27 AM      No results found for: IRON, TIBC, IBCT, FERR   No results found for: PH, PCO2, PO2  No components found for: GLPOC   No results found for: CPK, CKMB, TROPONINI             Total time: 40 minutes      Prolonged service was provided for  []30 min   []75 min in face to face time in the presence of the patient, spent as noted above.   Time Start:   Time End:

## 2023-03-02 NOTE — PROGRESS NOTES
[facility] Pharmacokinetic Monitoring Service - Vancomycin    Consulting Provider: Jean-Pierre Cowart [facility]   Indication: Bone and Joint Infection  Target Concentration: Goal AUC/BRENT 400-600 mg*hr/L  Day of Therapy: 2  Additional Antimicrobials: None    Pertinent Laboratory Values: Wt Readings from Last 1 Encounters:   03/01/23 141 lb (64 kg)     Temp Readings from Last 1 Encounters:   03/02/23 98.2 °F (36.8 °C) (Oral)     Estimated Creatinine Clearance: 33 mL/min (based on SCr of 0.96 mg/dL).   Recent Labs     03/01/23  1808 03/02/23  0327   CREATININE 0.91 0.96   WBC 5.3 6.1         Recent vancomycin administrations                     vancomycin (VANCOCIN) 1250 mg in sodium chloride 0.9% 250 mL IVPB (mg) 1,250 mg New Bag 03/01/23 0587                    Plan:  Current dosing regimen is therapeutic  Continue current dose  Vancomycin random level 03/03/23 with AM labs  Pharmacy will continue to monitor patient and adjust therapy as indicated      Phillip Petersen, PharmD  3/2/2023 11:39 AM   [phone]

## 2023-03-02 NOTE — CONSULTS
Podiatry Consult    Subjective:         Date of Consultation: March 2, 2023     Referring Physician: Dr. Zara Philip    Patient is a 80 y.o.  female who was seen today for evaluation and treatment of right foot 2nd toe wound and osteomyelitis. Patient Active Problem List    Diagnosis Date Noted    Osteomyelitis of second toe of right foot ([address] 03/01/2023    Glaucoma 03/01/2023    Hypertension 03/01/2023    Aortic valve stenosis, nonrheumatic 03/01/2023     No past medical history on file. No past surgical history on file. No family history on file.    Social History     Tobacco Use    Smoking status: Not on file    Smokeless tobacco: Not on file   Substance Use Topics    Alcohol use: Not on file     Current Facility-Administered Medications   Medication Dose Route Frequency Provider Last Rate Last Admin    BRIMONIDINE 0.2% + TIMOLOL 0.5% PANEL 1 drop  1 drop Ophthalmic TID Liliya Mathews MD        famotidine (PEPCID) tablet 20 mg  20 mg Oral BID Liliya Mathews MD        Latanoprost 0.005 % EMUL 1 drop  1 drop Ophthalmic QPM Liliya Mathews MD        amLODIPine (NORVASC) tablet 10 mg  10 mg Oral Daily Liliya Mathews MD        And    atorvastatin (LIPITOR) tablet 10 mg  10 mg Oral Daily Liliya Mathews MD        Vancomycin-Pharmacy to dose   Other RX Placeholder GUERO Morrissey        vancomycin (VANCOCIN) 1,000 mg in sodium chloride 0.9 % 250 mL (vial-mate) IVPB  1,000 mg IntraVENous Q24H GUERO Morrissey        sodium chloride flush 0.9 % injection 5-40 mL  5-40 mL IntraVENous 2 times per day Liliya Mathews MD   5 mL at 03/01/23 2316    sodium chloride flush 0.9 % injection 5-40 mL  5-40 mL IntraVENous PRN Liliya Mathews MD        0.9 % sodium chloride infusion   IntraVENous PRN Liliya Mathews MD        ondansetron (ZOFRAN-ODT) disintegrating tablet 4 mg  4 mg Oral Q8H PRN Liliya Mathews MD        Or    ondansetron (ZOFRAN) injection 4 mg  4 mg IntraVENous Q6H PRN Liliya Mathews MD        polyethylene glycol (GLYCOLAX) packet 17 g  17 g Oral Daily PRN Liliya Mathews MD        acetaminophen (TYLENOL) tablet 650 mg  650 mg Oral Q4H PRN Liliya Mathews MD        oxyCODONE (ROXICODONE) immediate release tablet 5 mg  5 mg Oral Q4H PRN Liliya Mathews MD        Or    oxyCODONE (ROXICODONE) immediate release tablet 10 mg  10 mg Oral Q4H PRN Liliya Mathews MD        morphine (PF) injection 2 mg  2 mg IntraVENous Q3H PRN Liliya Mathews MD          Allergies   Allergen Reactions    Ace Inhibitors Anaphylaxis     Other reaction(s): anaphylaxis/angioedema      Cephalexin Rash and Other (See Comments)    Gabapentin Other (See Comments)     Other reaction(s): gi distress  Nausea at 300 mg dose. Nausea at 300 mg dose. Iodinated Contrast Media Other (See Comments)     X-Ray Dye  Other reaction(s): unknown      Iodine Other (See Comments)    Timolol      Shortness of breath        Review of Systems:  A comprehensive review of systems was negative except for that written in the History of Present Illness. Objective:     Patient Vitals for the past 8 hrs:   BP Temp Temp src Pulse Resp SpO2   23 0726 (!) 140/57 98.5 °F (36.9 °C) Oral 56 16 100 %   23 0332 (!) 126/50 98.2 °F (36.8 °C) Oral 55 16 98 %     Temp (24hrs), Av °F (36.7 °C), Min:97.5 °F (36.4 °C), Max:98.5 °F (36.9 °C)      Physical Exam:      Vascular:  Dorsalis pedis pulses are diminished. Posterior tibial pulses are diminished bilateral.  Capillary fill time is greater than 3 seconds, bilateral.  Edema is not observed bilateral lower extremities. Varicosities are not observed bilateral lower extremities. Derm:  right foot 2nd toe wound down to bone with cellulitis  Ortho:  Muscle strength 5/5 for all groups tested.   Muscle tone normal. Inspection/palpation of bones - joints- muscle shows - within normal limits on the bilateral lower extremities. Left 2nd digit amputated previously  Neuro:  Light touch, sharp/dull, vibratory, and proprioception sensations all decreased bilateral lower extremities. Deep tendon reflexes intact decreased bilaterally.     Lab Review:   Recent Results (from the past 24 hour(s))   Urinalysis    Collection Time: 03/01/23  2:59 PM   Result Value Ref Range    Color, UA YELLOW      Appearance CLOUDY      Specific Gravity, UA 1.022 1.005 - 1.030      pH, Urine 6.5 5.0 - 8.0      Protein, UA Negative NEG mg/dL    Glucose, UA Negative NEG mg/dL    Ketones, Urine Negative NEG mg/dL    Bilirubin Urine Negative NEG      Blood, Urine Negative NEG      Urobilinogen, Urine 0.2 0.2 - 1.0 EU/dL    Nitrite, Urine Negative NEG      Leukocyte Esterase, Urine Negative NEG     CBC with Auto Differential    Collection Time: 03/01/23  6:08 PM   Result Value Ref Range    WBC 5.3 4.6 - 13.2 K/uL    RBC 3.60 (L) 4.20 - 5.30 M/uL    Hemoglobin 10.7 (L) 12.0 - 16.0 g/dL    Hematocrit 34.0 (L) 35.0 - 45.0 %    MCV 94.4 78.0 - 100.0 FL    MCH 29.7 24.0 - 34.0 PG    MCHC 31.5 31.0 - 37.0 g/dL    RDW 14.8 (H) 11.6 - 14.5 %    Platelets 617 151 - 201 K/uL    MPV 11.7 9.2 - 11.8 FL    Nucleated RBCs 0.0 0  WBC    nRBC 0.00 0.00 - 0.01 K/uL    Seg Neutrophils 34 (L) 40 - 73 %    Lymphocytes 51 21 - 52 %    Monocytes 10 3 - 10 %    Eosinophils % 4 0 - 5 %    Basophils 1 0 - 2 %    Immature Granulocytes 0 0.0 - 0.5 %    Segs Absolute 1.8 1.8 - 8.0 K/UL    Absolute Lymph # 2.7 0.9 - 3.6 K/UL    Absolute Mono # 0.5 0.05 - 1.2 K/UL    Absolute Eos # 0.2 0.0 - 0.4 K/UL    Basophils Absolute 0.1 0.0 - 0.1 K/UL    Absolute Immature Granulocyte 0.0 0.00 - 0.04 K/UL    Differential Type AUTOMATED     Basic Metabolic Panel    Collection Time: 03/01/23  6:08 PM   Result Value Ref Range    Sodium 141 136 - 145 mmol/L    Potassium 4.7 3.5 - 5.5 mmol/L    Chloride 115 (H) 100 - 111 mmol/L    CO2 22 21 - 32 mmol/L    Anion Gap 4 3.0 - 18 mmol/L    Glucose 85 74 - 99 mg/dL    BUN 31 (H) 7.0 - 18 MG/DL    Creatinine 0.91 0.6 - 1.3 MG/DL    Bun/Cre Ratio 34 (H) 12 - 20      Est, Glom Filt Rate 59 (L) >60 ml/min/1.73m2    Calcium 9.4 8.5 - 10.1 MG/DL   Basic Metabolic Panel    Collection Time: 03/02/23  3:27 AM   Result Value Ref Range    Sodium 142 136 - 145 mmol/L    Potassium 4.3 3.5 - 5.5 mmol/L    Chloride 115 (H) 100 - 111 mmol/L    CO2 22 21 - 32 mmol/L    Anion Gap 5 3.0 - 18 mmol/L    Glucose 84 74 - 99 mg/dL    BUN 27 (H) 7.0 - 18 MG/DL    Creatinine 0.96 0.6 - 1.3 MG/DL    Bun/Cre Ratio 28 (H) 12 - 20      Est, Glom Filt Rate 56 (L) >60 ml/min/1.73m2    Calcium 9.0 8.5 - 10.1 MG/DL   CBC    Collection Time: 03/02/23  3:27 AM   Result Value Ref Range    WBC 6.1 4.6 - 13.2 K/uL    RBC 3.33 (L) 4.20 - 5.30 M/uL    Hemoglobin 10.2 (L) 12.0 - 16.0 g/dL    Hematocrit 32.5 (L) 35.0 - 45.0 %    MCV 97.6 78.0 - 100.0 FL    MCH 30.6 24.0 - 34.0 PG    MCHC 31.4 31.0 - 37.0 g/dL    RDW 15.2 (H) 11.6 - 14.5 %    Platelets 313 161 - 062 K/uL    MPV 11.9 (H) 9.2 - 11.8 FL    Nucleated RBCs 0.0 0  WBC    nRBC 0.00 0.00 - 0.01 K/uL   Protime-INR    Collection Time: 03/02/23  3:27 AM   Result Value Ref Range    Protime 13.6 11.5 - 15.2 sec    INR 1.0 0.8 - 1.2         Impression:   Right foot wound  2. Right foot cellulitis  3. Right foot osteomyelitis    Recommendation:   Continue with anbibiotic regimen, alter as needed as per ID  2. Will proceed with surgical amputation of the 2nd digit to remove infected tissue  3.  Continue with supportive care

## 2023-03-02 NOTE — PERIOP NOTE
TRANSFER - IN REPORT:    Verbal report received from Circulator on Regenia Locus  being received from OR for routine progression of patient care      Report consisted of patient's Situation, Background, Assessment and   Recommendations(SBAR). Information from the following report(s) Surgery Report was reviewed with the receiving nurse. Opportunity for questions and clarification was provided. Assessment completed upon patient's arrival to unit and care assumed.

## 2023-03-03 LAB
ANION GAP SERPL CALC-SCNC: 3 MMOL/L (ref 3–18)
BUN SERPL-MCNC: 21 MG/DL (ref 7–18)
BUN/CREAT SERPL: 23 (ref 12–20)
CALCIUM SERPL-MCNC: 8.8 MG/DL (ref 8.5–10.1)
CHLORIDE SERPL-SCNC: 114 MMOL/L (ref 100–111)
CO2 SERPL-SCNC: 22 MMOL/L (ref 21–32)
CREAT SERPL-MCNC: 0.92 MG/DL (ref 0.6–1.3)
GLUCOSE SERPL-MCNC: 117 MG/DL (ref 74–99)
POTASSIUM SERPL-SCNC: 4.3 MMOL/L (ref 3.5–5.5)
SODIUM SERPL-SCNC: 139 MMOL/L (ref 136–145)
VANCOMYCIN SERPL-MCNC: 10.5 UG/ML (ref 5–40)

## 2023-03-03 PROCEDURE — 6370000000 HC RX 637 (ALT 250 FOR IP): Performed by: FAMILY MEDICINE

## 2023-03-03 PROCEDURE — 80202 ASSAY OF VANCOMYCIN: CPT

## 2023-03-03 PROCEDURE — 97530 THERAPEUTIC ACTIVITIES: CPT

## 2023-03-03 PROCEDURE — 6360000002 HC RX W HCPCS: Performed by: PODIATRIST

## 2023-03-03 PROCEDURE — 6370000000 HC RX 637 (ALT 250 FOR IP): Performed by: PODIATRIST

## 2023-03-03 PROCEDURE — 1100000000 HC RM PRIVATE

## 2023-03-03 PROCEDURE — 6360000002 HC RX W HCPCS: Performed by: HOSPITALIST

## 2023-03-03 PROCEDURE — 2580000003 HC RX 258: Performed by: PODIATRIST

## 2023-03-03 PROCEDURE — 80048 BASIC METABOLIC PNL TOTAL CA: CPT

## 2023-03-03 PROCEDURE — 36415 COLL VENOUS BLD VENIPUNCTURE: CPT

## 2023-03-03 PROCEDURE — 99232 SBSQ HOSP IP/OBS MODERATE 35: CPT | Performed by: NURSE PRACTITIONER

## 2023-03-03 RX ADMIN — BRIMONIDINE TARTRATE 1 DROP: 2 SOLUTION OPHTHALMIC at 21:29

## 2023-03-03 RX ADMIN — OXYCODONE 5 MG: 5 TABLET ORAL at 13:36

## 2023-03-03 RX ADMIN — BRIMONIDINE TARTRATE 1 DROP: 2 SOLUTION OPHTHALMIC at 08:16

## 2023-03-03 RX ADMIN — AMLODIPINE BESYLATE 10 MG: 5 TABLET ORAL at 08:21

## 2023-03-03 RX ADMIN — ATORVASTATIN CALCIUM 10 MG: 10 TABLET, FILM COATED ORAL at 08:20

## 2023-03-03 RX ADMIN — OXYCODONE 5 MG: 5 TABLET ORAL at 08:22

## 2023-03-03 RX ADMIN — LATANOPROST 1 DROP: 50 SOLUTION OPHTHALMIC at 18:33

## 2023-03-03 RX ADMIN — BRIMONIDINE TARTRATE 1 DROP: 2 SOLUTION OPHTHALMIC at 14:08

## 2023-03-03 RX ADMIN — DORZOLAMIDE HYDROCHLORIDE 1 DROP: 20 SOLUTION/ DROPS OPHTHALMIC at 21:29

## 2023-03-03 RX ADMIN — ENOXAPARIN SODIUM 30 MG: 100 INJECTION SUBCUTANEOUS at 08:19

## 2023-03-03 RX ADMIN — LEVOFLOXACIN 250 MG: 5 INJECTION, SOLUTION INTRAVENOUS at 13:36

## 2023-03-03 RX ADMIN — DORZOLAMIDE HYDROCHLORIDE 1 DROP: 20 SOLUTION/ DROPS OPHTHALMIC at 08:17

## 2023-03-03 RX ADMIN — SODIUM CHLORIDE, PRESERVATIVE FREE 10 ML: 5 INJECTION INTRAVENOUS at 21:29

## 2023-03-03 RX ADMIN — VANCOMYCIN HYDROCHLORIDE 1000 MG: 1 INJECTION, POWDER, LYOPHILIZED, FOR SOLUTION INTRAVENOUS at 18:34

## 2023-03-03 RX ADMIN — MORPHINE SULFATE 2 MG: 2 INJECTION, SOLUTION INTRAMUSCULAR; INTRAVENOUS at 05:01

## 2023-03-03 RX ADMIN — DORZOLAMIDE HYDROCHLORIDE 1 DROP: 20 SOLUTION/ DROPS OPHTHALMIC at 14:08

## 2023-03-03 RX ADMIN — OXYCODONE 5 MG: 5 TABLET ORAL at 01:20

## 2023-03-03 RX ADMIN — FAMOTIDINE 20 MG: 20 TABLET ORAL at 08:22

## 2023-03-03 ASSESSMENT — PAIN DESCRIPTION - DESCRIPTORS
DESCRIPTORS: ACHING

## 2023-03-03 ASSESSMENT — PAIN SCALES - GENERAL
PAINLEVEL_OUTOF10: 8
PAINLEVEL_OUTOF10: 10
PAINLEVEL_OUTOF10: 8
PAINLEVEL_OUTOF10: 7
PAINLEVEL_OUTOF10: 10
PAINLEVEL_OUTOF10: 8

## 2023-03-03 ASSESSMENT — PAIN DESCRIPTION - LOCATION
LOCATION: FOOT

## 2023-03-03 ASSESSMENT — PAIN DESCRIPTION - ORIENTATION
ORIENTATION: RIGHT

## 2023-03-03 ASSESSMENT — PAIN - FUNCTIONAL ASSESSMENT: PAIN_FUNCTIONAL_ASSESSMENT: ACTIVITIES ARE NOT PREVENTED

## 2023-03-03 ASSESSMENT — PAIN SCALES - WONG BAKER: WONGBAKER_NUMERICALRESPONSE: 0

## 2023-03-03 NOTE — PROGRESS NOTES
[facility]  (A Division of [address] [place] [address])                                                           Date of Admission: 3/1/2023   Date of Note: 3/3/2023  Reason for Consult: foot OM  Referring MD: Dr Ralph Alcocer      Current Antimicrobials:    Prior Antimicrobials:  Levofloxacin and Vancomycin 3/1 to date   NA   Allergy to antibiotics:  keflex     Assessment:     R 2nd toe chronic OM--S/P ampuation 3/2/23  --culture and pathology 3/2: Pending  Psoriatric arthritis  History of ampuation Left 2nd toe        Recommendation -- ID related:     FU OR cultures: NGSF  Cont with just vancomycin, dc levofloxacin  If surgeon confirms  OM resected completely, can DC pt off antibiotics. Otherwise, await culture results and biopsy finding    Dr Eloisa Gavin will be covering over the weekend and can be reached if questions on or consults at (. I will be rounding from 3/6/23. Thank you. Subjective:     She is eating at time of exam  Feels weak  No issue with current abx       Notes/Labs/Cultures and Imaging reports reviewed -- culture remain negative  HPI:       Raimundo Daniel is a 80 y.o. female who presented to ED c/o discoloration to the right second toe. States it has been there for many months. She is followed and plans for surgery, which is done this am.    Denies fevers or body aches . No issue with current antibiotics --levofloxacin and vancomycin. Active Hospital Problems    Diagnosis Date Noted    Osteomyelitis of second toe of right foot ([facility] [address] [M86.9] 03/01/2023     Priority: Medium    Glaucoma [H40.9] 03/01/2023     Priority: Medium    Hypertension [I10] 03/01/2023     Priority: Medium    Aortic valve stenosis, nonrheumatic [I35.0] 03/01/2023     Priority: Medium     History reviewed. No pertinent past medical history.   Past Surgical History:   Procedure Laterality Date    TOE AMPUTATION Right 3/2/2023    RIGHT FOOT 2ND TOE AMPUTATION PT IN ROOM #328 performed by Jean Lo DPM at [facility] MAIN OR     History reviewed. No pertinent family history.  Social History     Socioeconomic History    Marital status:      Spouse name: Not on file    Number of children: Not on file    Years of education: Not on file    Highest education level: Not on file   Occupational History    Not on file   Tobacco Use    Smoking status: Not on file    Smokeless tobacco: Not on file   Substance and Sexual Activity    Alcohol use: Not on file    Drug use: Not on file    Sexual activity: Not on file   Other Topics Concern    Not on file   Social History Narrative    Not on file     Social Determinants of Health     Financial Resource Strain: Not on file   Food Insecurity: Not on file   Transportation Needs: Not on file   Physical Activity: Not on file   Stress: Not on file   Social Connections: Not on file   Intimate Partner Violence: Not on file   Housing Stability: Not on file       Medications:  Current Facility-Administered Medications   Medication Dose Route Frequency Provider Last Rate Last Admin    latanoprost (XALATAN) 0.005 % ophthalmic solution 1 drop  1 drop Both Eyes QPM Jean Lo DPM   1 drop at 03/02/23 2027    amLODIPine (NORVASC) tablet 10 mg  10 mg Oral Daily Jean Lo DPM   10 mg at 03/03/23 0821    And    atorvastatin (LIPITOR) tablet 10 mg  10 mg Oral Daily Jean Lo DPM   10 mg at 03/03/23 0820    oxyCODONE (ROXICODONE) immediate release tablet 5 mg  5 mg Oral Q4H PRN Jean Lo DPM   5 mg at 03/03/23 1336    Vancomycin Random Level 03/03/23 with AM labs   Other Once GUERO Gomez        enoxaparin Sodium (LOVENOX) injection 30 mg  30 mg SubCUTAneous Daily Kely Walls MD   30 mg at 03/03/23 0819    brimonidine (ALPHAGAN) 0.2 % ophthalmic solution 1 drop  1 drop Both Eyes 3 times per day Kely Walls MD   1 drop at 03/03/23 1408    dorzolamide (TRUSOPT) 2 % ophthalmic solution 1 drop  1 drop Both Eyes TID Kely Walls MD   1 drop at  23 1408    famotidine (PEPCID) tablet 20 mg  20 mg Oral Daily Liliya Mathews MD   20 mg at 23 7225    Vancomycin-Pharmacy to dose   Other RX Placeholder Burt Wells DPM        vancomycin (VANCOCIN) 1,000 mg in sodium chloride 0.9 % 250 mL (vial-mate) IVPB  1,000 mg IntraVENous Q24H Burt Wells DPM   Stopped at 23    sodium chloride flush 0.9 % injection 5-40 mL  5-40 mL IntraVENous 2 times per day Burt Wells DPM   10 mL at 23 2030    sodium chloride flush 0.9 % injection 5-40 mL  5-40 mL IntraVENous PRN Burt Wells DPM        0.9 % sodium chloride infusion   IntraVENous PRN Burt Wells DPJEAN-CLAUDE        ondansetron (ZOFRAN-ODT) disintegrating tablet 4 mg  4 mg Oral Q8H PRN Burt Wells, DPM        Or    ondansetron [facility]) injection 4 mg  4 mg IntraVENous Q6H PRN Burt Wells, DPM        polyethylene glycol (GLYCOLAX) packet 17 g  17 g Oral Daily PRN Burt Wells, DPM        acetaminophen (TYLENOL) tablet 650 mg  650 mg Oral Q4H PRN Burt Wells, DPM        morphine (PF) injection 2 mg  2 mg IntraVENous Q3H PRN Burt Wells, DPM   2 mg at 23 0501        Review of Systems     Negative Unless BOLDED     General: fevers, chills, myalgias, arthralgias, unexplained weight loss, malaise, fatigue.   HEENT:  headaches, recent URI       Objective:       BP (!) 122/39   Pulse 64   Temp 99.7 °F (37.6 °C) (Oral)   Resp 18   Ht 5' 2\" (1.575 m)   Wt 156 lb 1.4 oz (70.8 kg)   SpO2 100%   BMI 28.55 kg/m²   Temp (24hrs), Av.8 °F (37.1 °C), Min:97.7 °F (36.5 °C), Max:99.7 °F (37.6 °C)      Lines: PIV    General:   WD WN , awake alert and oriented   Skin:   no rashes or skin lesions noted on limited exam except  Dressing over surgical site- right foot   HEENT:  Normocephalic, atraumatic, EOMI, no scleral icterus or pallor; no conjunctival hemmohage       Lungs:   non-labored       Abdomen:  soft, non-distended, active bowel sounds. Non-tender   Genitourinary:  deferred   Extremities:   no clubbing, cyanosis; no joint effusions or swelling; ; muscle mass appropriate for age   Neurologic:  No gross focal sensory abnormalities;  Cranial nerves intact   Psychiatric:   appropriate and interactive. Labs: Results:   Chemistry Recent Labs     03/01/23 1808 03/02/23 0327 03/03/23 0925    142 139   K 4.7 4.3 4.3   * 115* 114*   CO2 22 22 22   BUN 31* 27* 21*      CBC w/Diff Recent Labs     03/01/23 1808 03/02/23 0327   WBC 5.3 6.1   RBC 3.60* 3.33*   HGB 10.7* 10.2*   HCT 34.0* 32.5*    160            No results found for: SDES No components found for: CULT     Results       Procedure Component Value Units Date/Time    Culture, Anaerobic [[id number]] Collected: 03/02/23 0905    Order Status: Completed Specimen: Swab from Foot Updated: 03/03/23 0903     Special Requests NO SPECIAL REQUESTS        Culture NO GROWTH THUS FAR       Culture, Wound Aerobic Only [[id number]] Collected: 03/02/23 0905    Order Status: Completed Specimen: Swab from Foot Updated: 03/03/23 0902     Special Requests NO SPECIAL REQUESTS        Gram stain NO WBC'S SEEN        Culture NO GROWTH THUS FAR             Imaging: All imaging reviewed from Admission to present as per radiology interpretation in [address] [place] [address] ARLETH Soto MD  [facility])  Office #:     [phone]  [id number] [phone]   Office Fax: [phone]

## 2023-03-03 NOTE — OP NOTE
Operative Note      Patient: Imelda Ganser  YOB: 1931  MRN: [id number]    Date of Procedure: 3/2/2023    Pre-Op Diagnosis: OSTEOMYELITIS OF SECOND TOE OF RIGHT FOOT    Post-Op Diagnosis: Same       Procedure(s):  Right foot 2nd digit amputation    Surgeon(s):  Lila Pinto DPM    Assistant:   * No surgical staff found *    Anesthesia: Monitor Anesthesia Care, 30cc of 0.5% Marcaine plain    Estimated Blood Loss (mL): less than 50     Complications: none    Specimens:   ID Type Source Tests Collected by Time Destination   1 : right second toe Swab Foot CULTURE, ANAEROBIC, CULTURE, WOUND Lila Pinto DPM 3/2/2023 6932    A :  Tissue Toe SURGICAL PATHOLOGY Lila Pinto DPM 3/2/2023 0907        Implants:  * No implants in log *      Drains: * No LDAs found *    Findings: Consistent with pre-op diagnosis    Detailed Description of Procedure: The patient was brought into the operating room and secured on the operative table in supine position. The lower extremity was then scrubbed, prepped, and draped in the usual sterile fashion. Attention was then directed to the right foot 2nd digit where a well planned and marked racket incision was made encompassing the entirety of the 2nd digit. Incision was carried down to the level of osseous tissue and the the 2nd digit was disarticulated at the MTPJ level. The digit was removed from the field and sent for pathology. Culture swabs were taken in the deep recess of the wound. All nonviable soft tissue was resected and passed from the field. Surgical site was flushed and irrigated with copious amounts of saline to reveal healthy granular tissue. The soft tissue was then debulked and remodeled in order to allow for a well coapted primary closure. Surgical site was flushed and irrigated with saline solution. A layered anatomic closure was performed with a combination of vicryl and nylon sutures.  Surgical site was dressed with Xeroform, 4x4 gauze, kerlix, and ACE bandage. The patient tolerated the procedure and anesthesia well and was transferred from the OR to the recovery room with vital signs stable and vascular status intact to the lower extremity.      Electronically signed by Tari Gregorio DPM on 3/2/2023 at 9:12 PM

## 2023-03-03 NOTE — DISCHARGE INSTRUCTIONS
Post Operative Instructions                                                                 Francesca Perez DPM        General:  - ELEVATE, ELEVATE, ELEVATE! Elevate the operative leg as much as possible during the first 3-5 days. This will help with healing, pain, and comfort. - Elevate you leg above the level of your waist. Prop it up on multiple pillows to ensure the leg is cushioned appropriately. - You can walk in the surgical shoe, anytime you are walking, it MUST be in that shoe. - Get plenty of rest.  - On the ride home from the hospital ensure your leg is propped up in the backseat of the car and elevated. Bandages:   - Keep your bandages clean, dry, and intact. - Do not remove or change your dressing unless instructed by the surgeons office  - Ensure that the bandages do not get wet while bathing. Use a cast cover or water proof covering to make sure. If the dressings do get wet, you must let the office know and we will change the dressing. Not doing so can cause wound and/or infection. Medications:  -Take medications as instructed. Typically as follows:  - Percocet: every 4-6 hours as needed for severe pain. Take one of these before bed the night of surgery, even if not having any pain at that time. This will prevent pain in the middle of the night if the anesthesia wears off at that time. Take with food to help prevent nausea. - Zofran: Take as needed for nausea. - Do not combine percocet and tylenol, do not combine ketorolac with other NSAIDs such as aspirin or ibuprofen. - Once percocet is no longer needed, you may begin taking 1000mg Tylenol three times a day for pain, which you may combine with 800mg of Ibuprofen three times a day.   - Call with any questions or concerns

## 2023-03-03 NOTE — PROGRESS NOTES
Physical Therapy Goals:  Initiated 3/2/2023 to be met within 7-10 days. Short Term Goals  Time Frame for Short Term Goals: 7-10 days  Short Term Goal 1: 1. Patient will move from supine <> sit with S in prep for out of bed activity and change of position. Short Term Goal 2: 2. Patient will perform sit<> stand with S with RW in prep for transfers/ambulation  Short Term Goal 3: 3. Patient will transfer from bed <> chair with S with RW for time up in chair for completion of ADL activity  Short Term Goal 4: 4. Patient will ambulate 150 feet with S/LRAD for improved functional mobility at discharge  Short Term Goal 5: 5. Patient will ascend/descend 3-5 stairs with handrail(s) with minimal assist for home re-entry      Patient: Antonina Hayden (28 y.o. female)  Date: 3/2/2023  Primary Diagnosis: Osteoarthritis of toe [M19.079]  Osteomyelitis of second toe of right foot ([address] [M86.9]  Procedure(s) (LRB):  RIGHT FOOT 2ND TOE AMPUTATION PT IN ROOM #328 (Right) Day of Surgery   Precautions: Weight Bearing (Weight bearing as tolerated to right foot, must be in surgical, stiff soled flat, shoe.), Right Lower Extremity Weight Bearing: Weight Bearing As Tolerated,  ,  ,  ,  ,  ,    PLOF: independent amb w/o AD usually, but used cane at times    ASSESSMENT :  The patient is seen on med/surg unit following admission for above diagnosis and post surgery as noted above. Pt found supine in bed in NAD on PT arrival and w/o c/o pain except arthritic pain bilat hands R moderate and L mild. Pt with R foot post op dressing in place and w/o drainage. Pt presents with decreased independence in functional mobility, r/t decr'd functional strength, impaired bed mobility and transfers, gait deficits as noted below and decr'd activity tolerance r/t pain. Demonstrates supine<>sit with SBA, sit >stand with CGA and able to complete GT/RW/CGA ~68ft.    Gait deviations include decr'd astrid, decr'd step length, but reciprocal gt pattern noted. Educated in safety and sequencing for fall prevention. Pt returned to room and used bathroom with min A for toilet transfers due to low seat. Pt returned to bed and back to supine with SBA. Left with R LE elevated, nurse Aishwarya Manjarrez made aware of dressing breakthrough bleeding and of pt request for pain meds. Pt may benefit from continue IPPT to address goals above. Left with all needs in reach, educated in PT POC and agreeable to same. Recommend HHPT for follow up physical therapy upon discharge to reach maximal level of independence/safety with functional mobility. DEFICITS/IMPAIRMENTS:    , Body Structures, Functions, Activity Limitations Requiring Skilled Therapeutic Intervention: Decreased functional mobility ; Decreased ROM; Decreased strength; Increased pain    Patient will benefit from skilled intervention to address the above impairments. Patient's rehabilitation potential is considered to be Therapy Prognosis: Good. Factors which may influence rehabilitation potential include:   []         None noted  []         Mental ability/status  [x]         Medical condition  []         Home/family situation and support systems  []         Safety awareness  [x]         Pain tolerance/management  []         Other:      PLAN :  Recommendations and Planned Interventions:   Strengthening;ROM; Functional mobility training;Transfer training;Gait training;Stair training; Therapeutic activities; Home exercise program    Frequency/Duration: Patient will be followed by physical therapy to address goals, 1-2 times per day/3-5 days per week to address goals. Further Equipment Recommendations for Discharge:No,  Discharge Recommendation: Discharge Recommendations: Home with Home health PT    AMPA: 15/20    This AMPA score should be considered in conjunction with interdisciplinary team recommendations to determine the most appropriate discharge setting.  Patient's social support, diagnosis, medical stability, and prior level of function should also be taken into consideration. SUBJECTIVE:   Patient stated My hands are sore (arthritis).     OBJECTIVE DATA SUMMARY:   History reviewed. No pertinent past medical history. History reviewed. No pertinent surgical history.   Barriers to Learning/Limitations: Physical, Pain  Compensate with: visual, verbal, tactile, kinesthetic cues/model  Home Situation:  Social/Functional History  Lives With: Daughter  Type of Home: House  Home Layout: Two level  Home Access: Stairs to enter with rails  Entrance Stairs - Number of Steps: 4  Entrance Stairs - Rails: Left  Home Equipment: Jerez Dessert, rolling, Cane  Has the patient had two or more falls in the past year or any fall with injury in the past year?: No  Receives Help From: Family  ADL Assistance: Independent  Homemaking Assistance: Independent  Ambulation Assistance: Independent  Transfer Assistance: Independent  Active : No  Patient's  Info: Lucina naik  Mode of Transportation: Car  Occupation: Retired  Critical Behavior:  Orientation Level: Oriented X4  WNL  Strength:    Strength: Generally decreased, functional  Tone & Sensation:   Tone: Normal  Sensation: Intact  Range Of Motion:  AROM: Generally decreased, functional  Functional Mobility:  Bed Mobility:  Bed Mobility Training  Bed Mobility Training: Yes  Supine to Sit: Stand-by assistance  Sit to Supine: Stand-by assistance  Scooting: Stand-by assistance;Supervision  Transfers:  Transfer Training  Transfer Training: Yes  Sit to Stand: Contact-guard assistance  Stand to Sit: Contact-guard assistance  Toilet Transfer: Minimum assistance  Balance:   Balance  Sitting: Intact  Standing: Intact  Ambulation/Gait Training:  Gait Training  Gait Training: Yes  Gait  Overall Level of Assistance: Contact-guard assistance  Interventions: Safety awareness training;Verbal cues  Base of Support: Shift to left  Speed/Myranda: Pace decreased (< 100 feet/min)  Step Length: Left shortened;Right shortened  Gait Abnormalities: Antalgic  Assistive Device: Walker, rolling;Gait belt  Pain:  Pain level pre-treatment: 0/10   Pain level post-treatment: 6-7/10   Pain Intervention(s): Medication (see MAR); Rest, Ice, Repositioning  Response to intervention: Nurse notified, See doc flow  Activity Tolerance:   Activity Tolerance: Patient tolerated evaluation without incident;Patient limited by pain  Please refer to the flowsheet for vital signs taken during this treatment. After treatment:   []         Patient left in no apparent distress sitting up in chair  [x]         Patient left in no apparent distress in bed  [x]         Call bell left within reach  [x]         Nursing notified  []         Caregiver present  []         Bed alarm activated  []         SCDs applied    COMMUNICATION/EDUCATION:   Patient Education  Education Given To: Patient  Education Provided: Role of Therapy;Plan of Care;Precautions;Transfer Training; Fall Prevention Strategies (Gait Training)  Education Method: Verbal  Barriers to Learning:  (Physical)  Education Outcome: Verbalized understanding;Demonstrated understanding    Thank you for this referral.  Rosa Milton, PT  Minutes: 38      Eval Complexity: Decision Making: FROYLAN Ramirez 39 AM-PAC® Basic Mobility Inpatient Short Form (6-Clicks) Version 2    How much HELP from another person does the patient currently need    (If the patient hasn't done an activity recently, how much help from another person do you think he/she would need if he/she tried?)   Total (Total A or Dep)   A Lot  (Mod to Max A)   A Little (Sup or Min A)   None (Mod I to I)   Turning from your back to your side while in a flat bed without using bedrails? [] 1 [] 2 [x] 3 [] 4   2. Moving from lying on your back to sitting on the side of a flat bed without using bedrails? [] 1 [] 2 [x] 3 [] 4   3. Moving to and from a bed to a chair (including a wheelchair)?    [] 1 [] 2 [x] 3 [] 4   4. Standing up from a chair using your arms (e.g., wheelchair, or bedside chair)?   [] 1 [] 2 [x] 3 [] 4   5. Walking in hospital room?   [] 1 [] 2 [x] 3 [] 4   6. Climbing 3-5 steps with a railing?+   [] 1 [] 2 [] 3 [] 4   +If stair climbing cannot be assessed, skip item #6.  Sum responses from items 1-5.         Current research shows that an AM-PAC score of 18 (14 without stairs) or greater is associated with a discharge to the patient's home setting. Based on an AM-PAC score of /24 (or 15/20 if omitting stairs) and their current functional mobility deficits, it is recommended that the patient have 2-3 sessions per week of Physical Therapy at d/c to increase the patient's independence.

## 2023-03-03 NOTE — PROGRESS NOTES
D/C Plan: [facility] with physician follow up    Noted therapy recommendation for [facility]. Pt/family has selected [facility] as they have used this agency in the past.  CMS has been notified to assist. Anticipate pt will transition home with the above services when medically stable. Pt's family will transport pt home at discharge. CM to continue to follow and assist as needed.

## 2023-03-03 NOTE — PROGRESS NOTES
Hospitalist Progress Note-critical care note     Patient: Antonina Hayden MRN: [id number]  CSN: [id number]    YOB: 1931  Age: 80 y.o. Sex: female    DOA: 3/1/2023 LOS:  LOS: 2 days            Chief complaint: OM, glaucoma, htn, avs     Assessment/Plan         Active Hospital Problems    Diagnosis Date Noted    Osteomyelitis of second toe of right foot ([address] [M86.9] 03/01/2023     Priority: Medium    Glaucoma [H40.9] 03/01/2023     Priority: Medium    Hypertension [I10] 03/01/2023     Priority: Medium    Aortic valve stenosis, nonrheumatic [I35.0] 03/01/2023     Priority: Medium      Admitted for osteomyelitis right second toe for amputation. Right second toe osteomyelitis   Done AM, continue iv abx, id consult -waiting for surgical margin ,pain control   Pt/ot   ID seeing pt, will continue current iv abx till path results -still pending     Chronic glaucoma  Continue home eye drop , tolerated well     Hypertension  Continue home medication    Aortic valve stenosis, moderate  Continue seeing cardiologist as out-pt     Psoriatic arthritis    Previous amputation of left toe    Fall precaution     Advanced age   Palliative care consult for code status discussion      Subjective I feel  Abi Santana, had good night     Disposition :tbd,   Review of systems:    General: No fevers or chills. Cardiovascular: No chest pain or pressure. No palpitations. Pulmonary: No shortness of breath. Gastrointestinal: No nausea, vomiting. Vital signs/Intake and Output:  Visit Vitals  /79   Pulse 58   Temp 99.2 °F (37.3 °C) (Oral)   Resp 18   Ht 5' 2\" (1.575 m)   Wt 156 lb 1.4 oz (70.8 kg)   SpO2 98%   BMI 28.55 kg/m²     Current Shift:  03/03 0701 - 03/03 1900  In: -   Out: 420 [Urine:420]  Last three shifts:  03/01 1901 - 03/03 0700  In: 1178 [P.O.:341; I.V.:700]  Out: 355 [Urine:350]    Physical Exam:  General: WD, WN. Alert, cooperative, no acute distress    HEENT: NC, Atraumatic.   PERRLA, anicteric sclerae. Lungs: CTA Bilaterally. No Wheezing/Rhonchi/Rales. Heart:  Regular  rhythm,  +murmur, No Rubs, No Gallops  Abdomen: Soft, Non distended, Non tender. +Bowel sounds,   Extremities: No c/c/e rt foot wrapped with ace   Psych:   Not anxious or agitated. Neurologic:  No acute neurological deficit. Labs: Results:       Chemistry Recent Labs     03/01/23 1808 03/02/23 0327    142   K 4.7 4.3   * 115*   CO2 22 22   BUN 31* 27*        CBC w/Diff Recent Labs     03/01/23 1808 03/02/23 0327   WBC 5.3 6.1   RBC 3.60* 3.33*   HGB 10.7* 10.2*   HCT 34.0* 32.5*    160        Cardiac Enzymes No results for input(s): CPK, MEGHNA in the last 72 hours. Invalid input(s): [facility], [address] [place] [address]   Coagulation Recent Labs     03/02/23 0327   INR 1.0         Lipid Panel No results found for: CHOL, CHOLPOCT, CHOLX, CHLST, CHOLV, 570940, HDL, HDLC, LDL, LDLC, 985059, VLDLC, VLDL, TGLX, TRIGL   BNP Invalid input(s): BNPP   Liver Enzymes No results for input(s): TP, ALB in the last 72 hours. Invalid input(s): TBIL, AP, SGOT, GPT, DBIL   Thyroid Studies No results found for: T4, TSH     Procedures/imaging: see electronic medical records for all procedures/Xrays and details which were not copied into this note but were reviewed prior to creation of Plan    No results found.     Lazaro Philip MD

## 2023-03-03 NOTE — PROGRESS NOTES
Physical Therapy Goals:  Initiated 3/3/2023 to be met within 7-10 days. Short Term Goals  Time Frame for Short Term Goals: 7-10 days  Short Term Goal 1: 1. Patient will move from supine <> sit with S in prep for out of bed activity and change of position. Short Term Goal 2: 2. Patient will perform sit<> stand with S with RW in prep for transfers/ambulation  Short Term Goal 3: 3. Patient will transfer from bed <> chair with S with RW for time up in chair for completion of ADL activity  Short Term Goal 4: 4. Patient will ambulate 150 feet with S/LRAD for improved functional mobility at discharge  Short Term Goal 5: 5. Patient will ascend/descend 3-5 stairs with handrail(s) with minimal assist for home re-entry    []  Patient has met MD alexi juan for d/c home   []  Recommend HH with 24 hour adult care   [x]  Benefit from additional acute PT session to address:  rehab placement??       PHYSICAL THERAPY TREATMENT    Patient: Mulu Men (77 y.o. female)  Date: 3/3/2023  Diagnosis: Osteoarthritis of toe [M19.079]  Osteomyelitis of second toe of right foot ([address] [M86.9] Osteomyelitis of second toe of right foot ([address]  Procedure(s) (LRB):  RIGHT FOOT 2ND TOE AMPUTATION PT IN ROOM #328 (Right) 1 Day Post-Op  Precautions: Weight Bearing (Weight bearing as tolerated to right foot, must be in surgical, stiff soled flat, shoe.), Right Lower Extremity Weight Bearing: Weight Bearing As Tolerated,  ,  ,  ,  ,  ,    PLOF: independent, ambulatory without AD, has RW    ASSESSMENT:  Assessment  Assessment: Pt supine in bed upon arrival.  No difficulty sitting up EOB, increased time needed. Pt reports 10/10 pain when RLE hangs down. Increased time needed to attempt sit to stand. Unable to take steps due to severe pain. Returend to sitting and back to supine without assistance. Lunch arrived, assisted pt with set up. Notified nurse.   Activity Tolerance: Patient limited by pain  Discharge Recommendations: Home with Home health PT vs SNF (pt wants to go home)    Progression toward goals:   []      Improving appropriately and progressing toward goals  [x]      Improving slowly and progressing toward goals  []      Not making progress toward goals and plan of care will be adjusted     PLAN:  Patient continues to benefit from skilled intervention to address the above impairments.  Continue treatment per established plan of care.    Further Equipment Recommendations for Discharge:  Discharge Recommendation: Discharge Recommendations: Home with Home health PT    AMPA: 14/24    This AMPA score should be considered in conjunction with interdisciplinary team recommendations to determine the most appropriate discharge setting. Patient's social support, diagnosis, medical stability, and prior level of function should also be taken into consideration.     SUBJECTIVE:   Patient stated Subjective: I want to try, I want to get out of here.    OBJECTIVE DATA SUMMARY:   Critical Behavior:    WNL      Functional Mobility Training:  Bed Mobility:  Bed Mobility Training  Bed Mobility Training: Yes  Overall Level of Assistance: Additional time;Supervision  Supine to Sit: Supervision  Sit to Supine: Supervision  Scooting: Additional time  Transfers:  Transfer Training  Transfer Training: Yes  Overall Level of Assistance: Minimum assistance;Additional time  Sit to Stand: Minimum assistance;Additional time  Stand to Sit: Contact-guard assistance  Ambulation/Gait Training:    Gait Training  Gait Training:  (attempted to take steps, unable due to pain)  Gait  Distance (ft): 0 Feet  Assistive Device: Gait belt;Walker, rolling        Pain:  Pain level pre-treatment: 3/10  Pain level post-treatment: 10/10   Pain Intervention(s): Rest, Ice, Repositioning   Response to intervention: Nurse notified    Activity Tolerance:   Activity Tolerance: Patient limited by pain  Please refer to the flowsheet for vital signs taken during this  treatment. After treatment:   [] Patient left in no apparent distress sitting up in chair  [x] Patient left in no apparent distress in bed  [x] Call bell left within reach  [x] Nursing notified  [] Caregiver present  [] Bed alarm activated  [] SCDs applied      COMMUNICATION/EDUCATION:   Patient Education  Education Given To: Patient  Education Provided: Role of Therapy;Plan of Care  Education Method: Verbal;Teach Back  Barriers to Learning: None  Education Outcome: Verbalized understanding;Demonstrated understanding      DEE UREÑA PTA  Minutes: [address] [facility] AM-PAC® Basic Mobility Inpatient Short Form (6-Clicks) Version 2    How much HELP from another person does the patient currently need    (If the patient hasn't done an activity recently, how much help from another person do you think he/she would need if he/she tried?)   Total (Total A or Dep)   A Lot  (Mod to Max A)   A Little (Sup or Min A)   None (Mod I to I)   Turning from your back to your side while in a flat bed without using bedrails? [] 1 [] 2 [x] 3 [] 4   2. Moving from lying on your back to sitting on the side of a flat bed without using bedrails? [] 1 [] 2 [x] 3 [] 4   3. Moving to and from a bed to a chair (including a wheelchair)? [] 1 [x] 2 [] 3 [] 4   4. Standing up from a chair using your arms (e.g., wheelchair, or bedside chair)? [] 1 [] 2 [x] 3 [] 4   5. Walking in hospital room? [] 1 [x] 2 [] 3 [] 4   6. Climbing 3-5 steps with a railing?+   [x] 1 [] 2 [] 3 [] 4   +If stair climbing cannot be assessed, skip item #6. Sum responses from items 1-5. Current research shows that an AM-PAC score of 17 (13 without stairs) or less is not associated with a discharge to the patient's home setting.  Based on an AM-PAC score of 14/24 (**/20 if omitting stairs) and their current functional mobility deficits, it is recommended that the patient have 3-5 sessions per week of Physical Therapy at d/ to increase the patient's independence.

## 2023-03-03 NOTE — PLAN OF CARE
Problem: Safety - Adult  Goal: Able to ambulate independently  Description: Able to ambulate independently  Outcome: Progressing     Problem: Pain  Goal: Verbalizes/displays adequate comfort level or baseline comfort level  Outcome: Progressing     Problem: ABCDS Injury Assessment  Goal: Absence of physical injury  Outcome: Progressing

## 2023-03-03 NOTE — PROGRESS NOTES
Physical Therapy    1121: Pt sound asleep, did not arouse to verbal stimuli, will follow up again later for PT.

## 2023-03-03 NOTE — PROGRESS NOTES
Palliative Medicine Consult    Patient Name: Gabriella Wolfe  YOB: 1931    Date of Initial Consult: 3/2/2023  Date of follow-up: 3/3/2023  Reason for Consult: Goals of care discussions  Requesting Provider: Dr. Clementina Stephens  Primary Care Physician: Shola Vega MD      SUMMARY:   Gabriella Wolfe is a 80 y.o. with a past history of hypertension, aortic valve stenosis, psoriatic, and glaucoma, who was admitted on 3/1/2023 from home with a diagnosis of osteomyelitis of right second toe requiring amputation. Current medical issues leading to Palliative Medicine involvement include: goals of care discussions in the setting of advanced age with chronic comorbidities. 3/3/2023: Patient lying in bed in no acute distress, states her pain is controlled. She is status post right second toe amputation. Looking forward to going home soon. PALLIATIVE DIAGNOSES:   Goals of care discussions/advance care planning  Osteomyelitis of right second toe requiring amputation  Psoriatic arthritis  Advanced age/debility        PLAN:   3/3/2023: Palliative medicine team including  KORTNEY Phoenix and TEE met with patient at patient's bedside. Patient is awake, alert, and oriented x4. States her pain is controlled, looking forward to going home soon. Reviewed with patient her advance medical directive naming her daughter Trupti Mooney as primary medical power of  and daughter Chrystal Newberry as secondary medical power of . Patient states that this remains accurate. This document also notes that patient would not want life prolonging measures in the setting of terminal illness. Discussed the benefits and burdens of CPR in the event of cardiopulmonary arrest.  Discussed the benefits and burdens of intubation in the event of respiratory decline pre-arrest.  Patient recognized the difficulty and this topic, states she would like more time to talk with her family.   Encouraged her to incorporate her primary care doctor into these conversations. At this time patient supports full code with full interventions. Will sign off as goals of care have been established. Please re-consult should it be warranted. Thank you for the opportunity to provide care to this patient. See previous discussions below:    3/2/2023: Goals of care discussions/advance care planning: Palliative medicine team including  KORTNEY Jalloh and TEE met with patient at patient's bedside. Patient is awake, alert, and oriented x4. She is very pleasant and engaged in goals of care conversations. Introduced the role as palliative medicine. Patient states she is , has 4 children, has an advance medical directive naming her daughter Madi Nolan as primary medical power of  and daughter Alaina Cason as secondary medical power of . This was found on paper chart, awaiting scanning into EMR. Patient is status post right foot second toe amputation, admitted she would appreciate some rest.  We will follow-up tomorrow for further goals of care discussions. At this time patient is a full code with full interventions per chart review. Osteomyelitis of right second toe requiring amputation: Patient is status post amputation, patient is looking forward to going home. Pain reportedly controlled. Psoriatic arthritis: Patient is on Humira every Friday. Advanced age/debility: Patient lives with her daughter Mikael Muse, and her granddaughter Shaye. Prior to admission she reports being independent in activities of daily living, ambulates without assistive devices. Stopped driving a couple years ago due to personal choice. She still does her own yard work, and cooks. She appears quite robust for her age.   Initial consult note routed to primary continuity provider  Communicated plan of care with: Palliative IDT       GOALS OF CARE / TREATMENT PREFERENCES:   [====Goals of Care====]  GOALS OF CARE: Full code with full interventions TREATMENT PREFERENCES:   Code Status: Full Code    Advance Care Planning:  Demographics 3/2/2023   Marital Status                    The palliative care team has discussed with patient / health care proxy about goals of care / treatment preferences for patient.  [====Goals of Care====]         HISTORY:     History obtained from: Patient, chart    CHIEF COMPLAINT: Right foot second toe infection, status post amputation    HPI/SUBJECTIVE:    The patient is:   [x] Verbal and participatory  [] Non-participatory due to:   Oriented x4, engaged in goals of care conversations    Clinical Pain Assessment (nonverbal scale for severity on nonverbal patients):                FUNCTIONAL ASSESSMENT:     Palliative Performance Scale (PPS):60          PSYCHOSOCIAL/SPIRITUAL SCREENING:     Advance Care Planning:  Demographics 3/2/2023   Marital Status         Any spiritual / Tenriism concerns:  [] Yes /  [x] No    Caregiver Burnout:  [] Yes /  [] No /  [x] No Caregiver Present      Anticipatory grief assessment:   [x] Normal  / [] Maladaptive          REVIEW OF SYSTEMS:     Positive and pertinent negative findings in ROS are noted above in HPI. The following systems were [x] reviewed / [] unable to be reviewed as noted in HPI  Other findings are noted below. Systems: constitutional, ears/nose/mouth/throat, respiratory, gastrointestinal, genitourinary, musculoskeletal, integumentary, neurologic, psychiatric, endocrine. Positive findings noted below. Modified ESAS Completed by: provider                                            PHYSICAL EXAM:     From RN flowsheet:  Wt Readings from Last 3 Encounters:   03/03/23 156 lb 1.4 oz (70.8 kg)     Blood pressure (!) 122/39, pulse 64, temperature 99.7 °F (37.6 °C), temperature source Oral, resp. rate 18, height 5' 2\" (1.575 m), weight 156 lb 1.4 oz (70.8 kg), SpO2 100 %.                              Constitutional: Awake, alert, no acute distress, elderly, appears quite robust for her age  Eyes: pupils equal, anicteric  ENMT: no nasal discharge, moist mucous membranes  Cardiovascular: regular rhythm, distal pulses intact  Respiratory: breathing not labored, symmetric, on room air  Gastrointestinal: soft non-tender  Musculoskeletal: no deformity, no tenderness to palpation  Skin: warm, dry, right foot second toe amputation, dressing intact  Neurologic: following commands, moving all extremities, oriented x4  Psychiatric: full affect, no hallucinations         HISTORY:     Principal Problem:    Osteomyelitis of second toe of right foot (HCC)  Active Problems:    Glaucoma    Hypertension    Aortic valve stenosis, nonrheumatic  Resolved Problems:    Osteoarthritis of toe    History reviewed. No pertinent past medical history. History reviewed. No pertinent surgical history. History reviewed. No pertinent family history. History reviewed, no pertinent family history. Social History     Tobacco Use    Smoking status: Not on file    Smokeless tobacco: Not on file   Substance Use Topics    Alcohol use: Not on file     Allergies   Allergen Reactions    Ace Inhibitors Anaphylaxis     Other reaction(s): anaphylaxis/angioedema      Cephalexin Rash and Other (See Comments)    Gabapentin Other (See Comments)     Other reaction(s): gi distress  Nausea at 300 mg dose. Nausea at 300 mg dose.        Iodinated Contrast Media Other (See Comments)     X-Ray Dye  Other reaction(s): unknown      Iodine Other (See Comments)    Timolol      Shortness of breath      Current Facility-Administered Medications   Medication Dose Route Frequency    latanoprost (XALATAN) 0.005 % ophthalmic solution 1 drop  1 drop Both Eyes QPM    amLODIPine (NORVASC) tablet 10 mg  10 mg Oral Daily    And    atorvastatin (LIPITOR) tablet 10 mg  10 mg Oral Daily    oxyCODONE (ROXICODONE) immediate release tablet 5 mg  5 mg Oral Q4H PRN    Vancomycin Random Level 03/03/23 with AM labs   Other Once    levoFLOXacin (LEVAQUIN) 250 MG/50ML infusion 250 mg  250 mg IntraVENous Q24H    enoxaparin Sodium (LOVENOX) injection 30 mg  30 mg SubCUTAneous Daily    brimonidine (ALPHAGAN) 0.2 % ophthalmic solution 1 drop  1 drop Both Eyes 3 times per day    dorzolamide (TRUSOPT) 2 % ophthalmic solution 1 drop  1 drop Both Eyes TID    famotidine (PEPCID) tablet 20 mg  20 mg Oral Daily    Vancomycin-Pharmacy to dose   Other RX Placeholder    vancomycin (VANCOCIN) 1,000 mg in sodium chloride 0.9 % 250 mL (vial-mate) IVPB  1,000 mg IntraVENous Q24H    sodium chloride flush 0.9 % injection 5-40 mL  5-40 mL IntraVENous 2 times per day    sodium chloride flush 0.9 % injection 5-40 mL  5-40 mL IntraVENous PRN    0.9 % sodium chloride infusion   IntraVENous PRN    ondansetron (ZOFRAN-ODT) disintegrating tablet 4 mg  4 mg Oral Q8H PRN    Or    ondansetron (ZOFRAN) injection 4 mg  4 mg IntraVENous Q6H PRN    polyethylene glycol (GLYCOLAX) packet 17 g  17 g Oral Daily PRN    acetaminophen (TYLENOL) tablet 650 mg  650 mg Oral Q4H PRN    morphine (PF) injection 2 mg  2 mg IntraVENous Q3H PRN          LAB AND IMAGING FINDINGS:     Lab Results   Component Value Date/Time    WBC 6.1 03/02/2023 03:27 AM    HGB 10.2 03/02/2023 03:27 AM     03/02/2023 03:27 AM     Lab Results   Component Value Date/Time     03/03/2023 09:25 AM    K 4.3 03/03/2023 09:25 AM     03/03/2023 09:25 AM    CO2 22 03/03/2023 09:25 AM    BUN 21 03/03/2023 09:25 AM      No results found for: TP, ALB, GLOB, GGT  Lab Results   Component Value Date/Time    INR 1.0 03/02/2023 03:27 AM      No results found for: IRON, TIBC, IBCT, FERR   No results found for: PH, PCO2, PO2  No components found for: GLPOC   No results found for: CPK, CKMB, TROPONINI             Total time: 35 minutes      Prolonged service was provided for  []30 min   []75 min in face to face time in the presence of the patient, spent as noted above.   Time Start:   Time End:

## 2023-03-03 NOTE — PLAN OF CARE
Problem: Safety - Adult  Goal: Able to ambulate independently  Description: Able to ambulate independently  3/3/2023 1759 by Bg Breen RN  Outcome: Progressing  3/3/2023 0710 by Richard Wei RN  Outcome: Progressing     Problem: Pain  Goal: Verbalizes/displays adequate comfort level or baseline comfort level  3/3/2023 1759 by Bg Breen RN  Outcome: Progressing  3/3/2023 0710 by Richard Wei RN  Outcome: Progressing     Problem: ABCDS Injury Assessment  Goal: Absence of physical injury  3/3/2023 1759 by Bg Breen RN  Outcome: Progressing  3/3/2023 0710 by Richard Wei RN  Outcome: Progressing

## 2023-03-03 NOTE — PROGRESS NOTES
[address] [facility] Pharmacokinetic Monitoring Service - Vancomycin    Consulting Provider: Nas Pressley [facility]   Indication: Bone and Joint Infection  Target Concentration: Goal AUC/BRENT 400-600 mg*hr/L  Day of Therapy: 3  Additional Antimicrobials: Levaquin    Pertinent Laboratory Values: Wt Readings from Last 1 Encounters:   03/03/23 156 lb 1.4 oz (70.8 kg)     Temp Readings from Last 1 Encounters:   03/03/23 99.2 °F (37.3 °C) (Oral)     Estimated Creatinine Clearance: 36 mL/min (based on SCr of 0.92 mg/dL).   Recent Labs     03/01/23  1808 03/02/23  0327 03/03/23  0925   CREATININE 0.91 0.96 0.92   WBC 5.3 6.1  --      Recent vancomycin administrations                     vancomycin (VANCOCIN) 1,000 mg in sodium chloride 0.9 % 250 mL (vial-mate) IVPB (mg) 1,000 mg New Bag 03/02/23 1831    vancomycin (VANCOCIN) 1250 mg in sodium chloride 0.9% 250 mL IVPB (mg) 1,250 mg New Bag 03/01/23 1852                    Assessment:  Date/Time Current Dose Concentration Timing of Concentration (h) AUC   03/03/23 1115 Vanc 1g IV Q24H 10.5 mcg/mL 03/03/23 0925 478 mg/L.hr   Note: Serum concentrations collected for AUC dosing may appear elevated if collected in close proximity to the dose administered, this is not necessarily an indication of toxicity    Plan:  Current dosing regimen is therapeutic  Continue current dose  Pharmacy will continue to monitor patient and adjust therapy as indicated      Virgil DwyerD  3/3/2023 11:26 AM   [phone]

## 2023-03-03 NOTE — ACP (ADVANCE CARE PLANNING)
Advance Care Planning     General Advance Care Planning (ACP) Conversation    Date of Conversation: 3/3/2023    Conducted with: Patient with Decision Making Capacity, Evangelina Melgar NP (Palliative) and this writer.      Healthcare Decision Maker:    Patient has an advance medical directive (AMD) that has been placed on her chart. The AMD will need to be scanned in to the EMR.     Primary Decision Maker: Shira Lucero (Shira) - Child - [phone]  Secondary Decision Maker: Mei Craven (Daughter) - Child - [phone]      Content/Action Overview:    This writer, along with Evangelina Melgar NP, with the Palliative Care team; visited with patient today to follow up regarding her advance medical directive (AMD) and her current goals of care wishes. Patient is alert and oriented x 4. Patient did appear to be more drowsy today. The Palliative Care team verified the AMD that her daughter brought in and the Palliative Care team reviewed the document with patient. She stated that the document is a reflection of how she currently feels and the her decisions regarding goals of care and decision making. Patient was asked specifically about resuscitation, in the event that her heart and breathing were to stop.     Patient paused and was obvious she had not thought about what she would want in the event that her heart and breathing were to stop. Patient was educated on the risks and burdens of CPR. Patient wanted time to think about her wishes and she was encouraged to talk with her daughters about this subject and let her daughters know of her decision. She is aware that she will remain a full code until she makes a decision for DNR. At this time, patient will remain a FULL CODE WITH FULL INTERVENTIONS.      With goals of care well defined; the Palliative Care team will sign off.  Please reconsult team as needed/if appropriate.     Thank you for the Palliative Medicine consult and allowing us to participate in the  [facility] of [facility]. Length of Voluntary ACP Conversation in minutes:  16 minutes        Florentino Cota., MSW  Palliative Care   WR#[phone]

## 2023-03-04 LAB
ANION GAP SERPL CALC-SCNC: 3 MMOL/L (ref 3–18)
BUN SERPL-MCNC: 20 MG/DL (ref 7–18)
BUN/CREAT SERPL: 22 (ref 12–20)
CALCIUM SERPL-MCNC: 9 MG/DL (ref 8.5–10.1)
CHLORIDE SERPL-SCNC: 114 MMOL/L (ref 100–111)
CO2 SERPL-SCNC: 24 MMOL/L (ref 21–32)
CREAT SERPL-MCNC: 0.92 MG/DL (ref 0.6–1.3)
GLUCOSE SERPL-MCNC: 84 MG/DL (ref 74–99)
POTASSIUM SERPL-SCNC: 4.2 MMOL/L (ref 3.5–5.5)
SODIUM SERPL-SCNC: 141 MMOL/L (ref 136–145)

## 2023-03-04 PROCEDURE — 2580000003 HC RX 258: Performed by: PODIATRIST

## 2023-03-04 PROCEDURE — 6370000000 HC RX 637 (ALT 250 FOR IP): Performed by: PODIATRIST

## 2023-03-04 PROCEDURE — 6360000002 HC RX W HCPCS: Performed by: HOSPITALIST

## 2023-03-04 PROCEDURE — 97116 GAIT TRAINING THERAPY: CPT

## 2023-03-04 PROCEDURE — 6370000000 HC RX 637 (ALT 250 FOR IP): Performed by: FAMILY MEDICINE

## 2023-03-04 PROCEDURE — 36415 COLL VENOUS BLD VENIPUNCTURE: CPT

## 2023-03-04 PROCEDURE — 80048 BASIC METABOLIC PNL TOTAL CA: CPT

## 2023-03-04 PROCEDURE — 6360000002 HC RX W HCPCS: Performed by: PODIATRIST

## 2023-03-04 PROCEDURE — 1100000000 HC RM PRIVATE

## 2023-03-04 RX ADMIN — ACETAMINOPHEN 650 MG: 325 TABLET ORAL at 09:04

## 2023-03-04 RX ADMIN — DORZOLAMIDE HYDROCHLORIDE 1 DROP: 20 SOLUTION/ DROPS OPHTHALMIC at 20:50

## 2023-03-04 RX ADMIN — ACETAMINOPHEN 650 MG: 325 TABLET ORAL at 20:56

## 2023-03-04 RX ADMIN — ENOXAPARIN SODIUM 30 MG: 100 INJECTION SUBCUTANEOUS at 08:59

## 2023-03-04 RX ADMIN — VANCOMYCIN HYDROCHLORIDE 1000 MG: 1 INJECTION, POWDER, LYOPHILIZED, FOR SOLUTION INTRAVENOUS at 18:32

## 2023-03-04 RX ADMIN — SODIUM CHLORIDE, PRESERVATIVE FREE 10 ML: 5 INJECTION INTRAVENOUS at 09:05

## 2023-03-04 RX ADMIN — SODIUM CHLORIDE, PRESERVATIVE FREE 10 ML: 5 INJECTION INTRAVENOUS at 21:59

## 2023-03-04 RX ADMIN — DORZOLAMIDE HYDROCHLORIDE 1 DROP: 20 SOLUTION/ DROPS OPHTHALMIC at 14:49

## 2023-03-04 RX ADMIN — ATORVASTATIN CALCIUM 10 MG: 10 TABLET, FILM COATED ORAL at 09:00

## 2023-03-04 RX ADMIN — BRIMONIDINE TARTRATE 1 DROP: 2 SOLUTION OPHTHALMIC at 14:48

## 2023-03-04 RX ADMIN — LATANOPROST 1 DROP: 50 SOLUTION OPHTHALMIC at 18:36

## 2023-03-04 RX ADMIN — FAMOTIDINE 20 MG: 20 TABLET ORAL at 09:00

## 2023-03-04 RX ADMIN — BRIMONIDINE TARTRATE 1 DROP: 2 SOLUTION OPHTHALMIC at 22:27

## 2023-03-04 RX ADMIN — AMLODIPINE BESYLATE 10 MG: 5 TABLET ORAL at 09:00

## 2023-03-04 RX ADMIN — BRIMONIDINE TARTRATE 1 DROP: 2 SOLUTION OPHTHALMIC at 06:51

## 2023-03-04 RX ADMIN — DORZOLAMIDE HYDROCHLORIDE 1 DROP: 20 SOLUTION/ DROPS OPHTHALMIC at 09:05

## 2023-03-04 ASSESSMENT — PAIN DESCRIPTION - DESCRIPTORS: DESCRIPTORS: ACHING

## 2023-03-04 ASSESSMENT — PAIN SCALES - GENERAL: PAINLEVEL_OUTOF10: 6

## 2023-03-04 ASSESSMENT — PAIN DESCRIPTION - LOCATION: LOCATION: FOOT

## 2023-03-04 ASSESSMENT — PAIN DESCRIPTION - ORIENTATION: ORIENTATION: RIGHT

## 2023-03-04 NOTE — PLAN OF CARE
Problem: Safety - Adult  Goal: Able to ambulate independently  Description: Able to ambulate independently  3/4/2023 0045 by Gaby Sinclair RN  Outcome: Progressing  3/3/2023 1759 by Yamile Devi RN  Outcome: Progressing     Problem: Pain  Goal: Verbalizes/displays adequate comfort level or baseline comfort level  3/4/2023 0045 by Gaby Sinclair RN  Outcome: Progressing  3/3/2023 1759 by Yamile Devi RN  Outcome: Progressing

## 2023-03-04 NOTE — PROGRESS NOTES
[address] [facility] Pharmacokinetic Monitoring Service - Vancomycin    Consulting Provider: Cammie Velarde [facility]   Indication: Bone and Joint Infection  Target Concentration: Goal AUC/BRENT 400-600 mg*hr/L  Day of Therapy: 4  Additional Antimicrobials: None    Pertinent Laboratory Values: Wt Readings from Last 1 Encounters:   03/03/23 156 lb 1.4 oz (70.8 kg)     Temp Readings from Last 1 Encounters:   03/04/23 98.6 °F (37 °C) (Oral)     Estimated Creatinine Clearance: 36 mL/min (based on SCr of 0.92 mg/dL).   Recent Labs     03/01/23  1808 03/02/23  0327 03/03/23  0925 03/04/23  0430   CREATININE 0.91 0.96 0.92 0.92   WBC 5.3 6.1  --   --      Recent vancomycin administrations                     vancomycin (VANCOCIN) 1,000 mg in sodium chloride 0.9 % 250 mL (vial-mate) IVPB (mg) 1,000 mg New Bag 03/03/23 1834     1,000 mg New Bag 03/02/23 1831    vancomycin (VANCOCIN) 1250 mg in sodium chloride 0.9% 250 mL IVPB (mg) 1,250 mg New Bag 03/01/23 1852                    Plan:  Current dosing regimen is therapeutic  Continue current dose  Pharmacy will continue to monitor patient and adjust therapy as indicated    Margot Schmitt PharmD  3/4/2023 7:15 AM   [phone]

## 2023-03-04 NOTE — PROGRESS NOTES
2322  Bedside and verbal shift change report given to Garret Noriega RN (on coming nurse) by Kathryn Saldivar RN (off going nurse). Report included the following information SBAR, Kardex, Intake/Output and MAR.    0726  Bedside and verbal shift change report given to Richie Cagle RN (on coming nurse) by Garret Noriega RN (off going nurse). Report included the following information SBAR, Kardex, Intake/Output and MAR.

## 2023-03-04 NOTE — PROGRESS NOTES
Hospitalist Progress Note-critical care note     Patient: Lux Boland MRN: [id number]  CSN: [id number]    YOB: 1931  Age: 80 y.o. Sex: female    DOA: 3/1/2023 LOS:  LOS: 3 days            Chief complaint: OM, glaucoma, htn, avs     Assessment/Plan     Active Hospital Problems    Diagnosis Date Noted    Osteomyelitis of second toe of right foot ([address] [M86.9] 03/01/2023     Priority: Medium    Glaucoma [H40.9] 03/01/2023     Priority: Medium    Hypertension [I10] 03/01/2023     Priority: Medium    Aortic valve stenosis, nonrheumatic [I35.0] 03/01/2023     Priority: Medium     Admitted for osteomyelitis, right second toe amputation. Right second toe osteomyelitis   S/p right foot 2nd digit amputation  Per ID: If surgeon confirms OM resected completely, can DC pt off antibiotics, per podiatry no clinical signs of infection remain, early culture results are negative for growth, pathology results pending, patient is stable for discharge from podiatry perspective, f/u as OP for post-operative care   Just on IV vanc now   Pt/ot     Surgical site bleeding and TTP -  RN to page podiatry     Chronic glaucoma  Continue home eye drop , tolerated well     Hypertension  Continue home medication    Aortic valve stenosis, moderate  Continue seeing cardiologist as out-pt     Psoriatic arthritis    Previous amputation of left toe    Fall precaution     Advanced age   Palliative care consult for code status discussion     Multiple family members at bedside and daughter on telephone      Subjective: my foot is bleeding and it hurts when I walk     Disposition: pending podiatry update for pain and bleeding   Review of systems:    General: No fevers or chills. Cardiovascular: No chest pain or pressure. No palpitations. Pulmonary: No shortness of breath. Gastrointestinal: No nausea, vomiting.      Vital signs/Intake and Output:  Visit Vitals  BP (!) 94/49   Pulse 58   Temp 98.8 °F (37.1 °C) (Oral)   Resp 17 Ht 5' 2\" (1.575 m)   Wt 156 lb 1.4 oz (70.8 kg)   SpO2 99%   BMI 28.55 kg/m²     Current Shift:  03/04 0701 - 03/04 1900  In: 240 [P.O.:240]  Out: -   Last three shifts:  03/02 1901 - 03/04 0700  In: 240 [P.O.:240]  Out: 2103 [Urine:1420]    Physical Exam:  General: WD, WN. Alert, cooperative, no acute distress    HEENT: NC, Atraumatic. PERRLA, anicteric sclerae. Lungs: CTA Bilaterally. No Wheezing/Rhonchi/Rales. Heart:  Regular  rhythm,  +murmur, No Rubs, No Gallops  Abdomen: Soft, Non distended, Non tender. +Bowel sounds,   Extremities: Rt foot bleeding thru all bandages and onto bed covers, when unrwrapped oozing blood thru stitches   Psych:   Not anxious or agitated. Neurologic:  No acute neurological deficit. Labs: Results:       Chemistry Recent Labs     03/02/23  0327 03/03/23  0925 03/04/23  0430    139 141   K 4.3 4.3 4.2   * 114* 114*   CO2 22 22 24   BUN 27* 21* 20*        CBC w/Diff Recent Labs     03/01/23  1808 03/02/23  0327   WBC 5.3 6.1   RBC 3.60* 3.33*   HGB 10.7* 10.2*   HCT 34.0* 32.5*    160        Cardiac Enzymes No results for input(s): CPK, MEGHNA in the last 72 hours. Invalid input(s): CKRMB, [facility]   Coagulation Recent Labs     03/02/23  0327   INR 1.0         Lipid Panel No results found for: CHOL, CHOLPOCT, CHOLX, CHLST, CHOLV, 540010, HDL, HDLC, LDL, LDLC, 764727, VLDLC, VLDL, TGLX, TRIGL   BNP Invalid input(s): BNPP   Liver Enzymes No results for input(s): TP, ALB in the last 72 hours. Invalid input(s): TBIL, AP, SGOT, GPT, DBIL   Thyroid Studies No results found for: T4, TSH     Procedures/imaging: see electronic medical records for all procedures/Xrays and details which were not copied into this note but were reviewed prior to creation of Plan    No results found.

## 2023-03-04 NOTE — PROGRESS NOTES
Physical Therapy Goals:  Initiated 3/4/2023 to be met within 7-10 days. Short Term Goals  Time Frame for Short Term Goals: 7-10 days  Short Term Goal 1: 1. Patient will move from supine <> sit with S in prep for out of bed activity and change of position. Short Term Goal 2: 2. Patient will perform sit<> stand with S with RW in prep for transfers/ambulation  Short Term Goal 3: 3. Patient will transfer from bed <> chair with S with RW for time up in chair for completion of ADL activity  Short Term Goal 4: 4. Patient will ambulate 150 feet with S/LRAD for improved functional mobility at discharge  Short Term Goal 5: 5. Patient will ascend/descend 3-5 stairs with handrail(s) with minimal assist for home re-entry    []  Patient has met MD alexi juan for d/c home   []  Recommend HH with 24 hour adult care   [x]  Benefit from additional acute PT session to address:  stair training      PHYSICAL THERAPY TREATMENT    Patient: Sanjuana Haskins (3 y.o. female)  Date: 3/4/2023  Diagnosis: Osteoarthritis of toe [M19.079]  Osteomyelitis of second toe of right foot ([address] [M86.9] Osteomyelitis of second toe of right foot ([address]  Procedure(s) (LRB):  RIGHT FOOT 2ND TOE AMPUTATION PT IN ROOM #328 (Right) 2 Days Post-Op  Precautions: Weight Bearing (Weight bearing as tolerated to right foot, must be in surgical, stiff soled flat, shoe.), Right Lower Extremity Weight Bearing: Weight Bearing As Tolerated,  ,  ,  ,  ,  ,    PLOF: ambulatory without AD, has RW    ASSESSMENT:  Assessment  Assessment: Pt supine in bed upon arrival.  Sat up EOB without difficulty. Less pain with RLE hanging down. CGA for sit to stand. Ambulated 20ft with RW, step to gt pattern, decreased step height and length, narrow JEFF, no LOB, CGA required. Returned to supien in bed.   Activity Tolerance: Patient tolerated treatment well;Patient limited by pain  Discharge Recommendations: Home with Home health PT    Progression toward goals:   [] Improving appropriately and progressing toward goals  [x]      Improving slowly and progressing toward goals  []      Not making progress toward goals and plan of care will be adjusted     PLAN:  Patient continues to benefit from skilled intervention to address the above impairments. Continue treatment per established plan of care. Further Equipment Recommendations for Discharge: RW  Discharge Recommendation: Discharge Recommendations: Home with Home health PT    AMPA: 15/20    This AMPA score should be considered in conjunction with interdisciplinary team recommendations to determine the most appropriate discharge setting. Patient's social support, diagnosis, medical stability, and prior level of function should also be taken into consideration. SUBJECTIVE:   Patient stated Subjective: ok    OBJECTIVE DATA SUMMARY:   Critical Behavior:    WNL      Functional Mobility Training:  Bed Mobility:  Bed Mobility Training  Overall Level of Assistance: Stand-by assistance; Additional time  Supine to Sit: Stand-by assistance; Additional time  Sit to Supine: Stand-by assistance  Scooting: Stand-by assistance; Additional time  Transfers:  Transfer Training  Overall Level of Assistance: Contact-guard assistance  Sit to Stand: Contact-guard assistance  Stand to Sit: Contact-guard assistance  Balance:  Balance  Sitting: Intact  Standing: With support   Ambulation/Gait Training:  Gait Training  Gait Training: Yes  Right Side Weight Bearing: As tolerated  Left Side Weight Bearing: As tolerated  Gait  Overall Level of Assistance: Contact-guard assistance  Speed/Myranda: Slow  Step Length: Left shortened;Right shortened  Gait Abnormalities: Antalgic; Step to gait  Distance (ft): 20 Feet  Assistive Device: Gait belt;Walker, rolling        Pain:  Pain level pre-treatment: 3/10  Pain level post-treatment: 5-6/10   Pain Intervention(s): Rest, Ice, Repositioning   Response to intervention: Nurse notified    Activity Tolerance: Activity Tolerance: Patient tolerated treatment well;Patient limited by pain  Please refer to the flowsheet for vital signs taken during this treatment. After treatment:   [] Patient left in no apparent distress sitting up in chair  [x] Patient left in no apparent distress in bed  [x] Call bell left within reach  [] Nursing notified  [] Caregiver present  [] Bed alarm activated  [] SCDs applied      COMMUNICATION/EDUCATION:   Patient Education  Education Given To: Patient  Education Provided: Role of Therapy;Plan of Care;Precautions;Transfer Training  Education Method: Verbal;Teach Back  Barriers to Learning: None  Education Outcome: Verbalized understanding;Demonstrated understanding      DEE UREÑA PTA  Minutes: [address] AM-PAC® Basic Mobility Inpatient Short Form (6-Clicks) Version 2    How much HELP from another person does the patient currently need    (If the patient hasn't done an activity recently, how much help from another person do you think he/she would need if he/she tried?)   Total (Total A or Dep)   A Lot  (Mod to Max A)   A Little (Sup or Min A)   None (Mod I to I)   Turning from your back to your side while in a flat bed without using bedrails? [] 1 [] 2 [x] 3 [] 4   2. Moving from lying on your back to sitting on the side of a flat bed without using bedrails? [] 1 [] 2 [x] 3 [] 4   3. Moving to and from a bed to a chair (including a wheelchair)? [] 1 [] 2 [x] 3 [] 4   4. Standing up from a chair using your arms (e.g., wheelchair, or bedside chair)? [] 1 [] 2 [x] 3 [] 4   5. Walking in hospital room? [] 1 [] 2 [x] 3 [] 4   6. Climbing 3-5 steps with a railing?+   [] 1 [] 2 [] 3 [] 4   +If stair climbing cannot be assessed, skip item #6. Sum responses from items 1-5. Current research shows that an AM-PAC score of 18 (14 without stairs) or greater is associated with a discharge to the patient's home setting.  Based on an AM-PAC score of /24 (or 15/20 if omitting stairs) and their current functional mobility deficits, it is recommended that the patient have 2-3 sessions per week of Physical Therapy at d/c to increase the patient's independence.

## 2023-03-04 NOTE — PROGRESS NOTES
Bedside and Verbal shift change report given to PAULINO Solis RN (oncoming nurse) by Kiana Friedman RN  (offgoing nurse). Report included the following information Nurse Handoff Report and Index.

## 2023-03-04 NOTE — PROGRESS NOTES
Received call from Dr Mark Anthony Weber. Dr Mark Anthony Weber states bleeding on right foot incision is normal and expected as the limb becomes more dependent. . Dr Mark Anthony Weber recommends  elevation of right foot. And states Patient is ready for discharge from his his medical stand point.

## 2023-03-05 VITALS
WEIGHT: 156.09 LBS | OXYGEN SATURATION: 99 % | HEART RATE: 67 BPM | TEMPERATURE: 97.7 F | BODY MASS INDEX: 28.72 KG/M2 | SYSTOLIC BLOOD PRESSURE: 123 MMHG | HEIGHT: 62 IN | DIASTOLIC BLOOD PRESSURE: 67 MMHG | RESPIRATION RATE: 18 BRPM

## 2023-03-05 LAB
BACTERIA SPEC CULT: ABNORMAL
BACTERIA SPEC CULT: NORMAL
GRAM STN SPEC: ABNORMAL
SERVICE CMNT-IMP: ABNORMAL
SERVICE CMNT-IMP: NORMAL

## 2023-03-05 PROCEDURE — 2580000003 HC RX 258: Performed by: PODIATRIST

## 2023-03-05 PROCEDURE — 6370000000 HC RX 637 (ALT 250 FOR IP): Performed by: FAMILY MEDICINE

## 2023-03-05 PROCEDURE — 6370000000 HC RX 637 (ALT 250 FOR IP): Performed by: PODIATRIST

## 2023-03-05 RX ADMIN — FAMOTIDINE 20 MG: 20 TABLET ORAL at 09:32

## 2023-03-05 RX ADMIN — BRIMONIDINE TARTRATE 1 DROP: 2 SOLUTION OPHTHALMIC at 06:10

## 2023-03-05 RX ADMIN — DORZOLAMIDE HYDROCHLORIDE 1 DROP: 20 SOLUTION/ DROPS OPHTHALMIC at 09:32

## 2023-03-05 RX ADMIN — DORZOLAMIDE HYDROCHLORIDE 1 DROP: 20 SOLUTION/ DROPS OPHTHALMIC at 15:02

## 2023-03-05 RX ADMIN — SODIUM CHLORIDE, PRESERVATIVE FREE 10 ML: 5 INJECTION INTRAVENOUS at 09:32

## 2023-03-05 RX ADMIN — BRIMONIDINE TARTRATE 1 DROP: 2 SOLUTION OPHTHALMIC at 15:02

## 2023-03-05 RX ADMIN — ATORVASTATIN CALCIUM 10 MG: 10 TABLET, FILM COATED ORAL at 09:32

## 2023-03-05 NOTE — PROGRESS NOTES
D/C Plan: Home with [facility] and surgeon follow up with family to drive within the next 24 hours    CM spoke with melani who reported she has been told she'll discharge this evening. CM offered LACY, patient is in agreement to discharge. CM to send clinicals to [facility] and notify them patient is discharging.

## 2023-03-05 NOTE — PROGRESS NOTES
Progress Note      Patient: Jazmyn Strickland               Sex: female          DOA: 3/1/2023         YOB: 1931      Age:  80 y.o.        LOS:  LOS: 4 days               Subjective:     Patient is POD#3 s/p right foot 2nd digit amputation. Continues to improve, reports some pain, though better when ambulating with help of PT/OT. Reports some blood on the bandage after ambulating. Objective:      Visit Vitals  /68   Pulse 56   Temp 97.9 °F (36.6 °C)   Resp 16   Ht 5' 2\" (1.575 m)   Wt 156 lb 1.4 oz (70.8 kg)   SpO2 100%   BMI 28.55 kg/m²       Physical Exam:  Dermatology: right foot surgical incision is well coapted, no clinical signs of infection. Vascular: pedal pulses diminished, CRT delayed  Neuro: protective sensations intact  Muscoloskeletal: MMT noted to be 5/5 in all direction without pain. Bilateral 2nd digit amputations, left has since healed well. Intake and Output:  Current Shift:  No intake/output data recorded. Last three shifts:  03/03 1901 - 03/05 0700  In: 240 [P.O.:240]  Out: 1200 [Urine:1200]    Lab/Data Reviewed:  Labs reviewed      Assessment/Plan     Principal Problem:    Osteomyelitis of second toe of right foot ([address]  Active Problems:    Glaucoma    Hypertension    Aortic valve stenosis, nonrheumatic  Resolved Problems:    Osteoarthritis of toe      Patient is POD#1 s/p right foot 2nd digit amputation  Patient has progressed well thus far, no clinical signs of infection remain  Culture results show few diptheroids. Confident all osteomyelitis was resected during surgery. Antibiotic regimen as per ID, appreciate the input  Dressings were changed on today's examination  Dressings: Xeroform, 4x4 gauze, ABD, kerlix, ACE. Patient is stable for discharge from podiatric perspective  Patient will follow up for postoperative care    It is expected for some bleeding to the site after surgery with ambulation, especially with patient on blood thinners.  Added a more reinforced bandage today, but will benefit from limiting ambulation as much as able.

## 2023-03-05 NOTE — PLAN OF CARE
Problem: Safety - Adult  Goal: Able to ambulate independently  Description: Able to ambulate independently  Outcome: Progressing     Problem: Pain  Goal: Verbalizes/displays adequate comfort level or baseline comfort level  Outcome: Progressing

## 2023-03-05 NOTE — PROGRESS NOTES
Bedside shift change report given to James Reinoso (oncoming nurse) by Lawyer Efraín RN   (offgoing nurse). Report included the following information Nurse Handoff Report, Adult Overview, Intake/Output, MAR, and Recent Results.

## 2023-03-05 NOTE — DISCHARGE SUMMARY
Discharge Summary    Patient: Guerda Garnica MRN: [id number]  CSN: [id number]    YOB: 1931  Age: 80 y.o. Sex: female    DOA: 3/1/2023 LOS:  LOS: 4 days   Discharge Date: 3/5/2023     Primary Care Provider:  Annemarie Nelson MD    Admission Diagnoses: Osteoarthritis of toe [M19.079]  Osteomyelitis of second toe of right foot [facility]) [M86.9]    Discharge Diagnoses: Active Hospital Problems    Diagnosis Date Noted    Osteomyelitis of second toe of right foot ([address] [M86.9] 03/01/2023     Priority: Medium    Glaucoma [H40.9] 03/01/2023     Priority: Medium    Hypertension [I10] 03/01/2023     Priority: Medium    Aortic valve stenosis, nonrheumatic [I35.0] 03/01/2023     Priority: Medium       Discharge Medications:        Medication List        CONTINUE taking these medications      amLODIPine-atorvastatatin 10-10 MG per tablet  Commonly known as: CADUET     BRIMONIDINE 0.2% + TIMOLOL 0.5% PANEL     Diclofenac & B6-FA-B12 3 & 46-0.4-1.1 % & MG Kit     famotidine 20 MG tablet  Commonly known as: PEPCID     Latanoprost 0.005 % Emul     montelukast 10 MG tablet  Commonly known as: SINGULAIR     MULTIVITAMINS/MINERALS ADULT PO     oxyCODONE-acetaminophen 5-325 MG per tablet  Commonly known as: PERCOCET     pregabalin 50 MG capsule  Commonly known as: LYRICA     triamterene-hydroCHLOROthiazide 37.5-25 MG per tablet  Commonly known as: MAXZIDE-25                Discharge Condition: Good    Procedures : s/p right foot 2nd digit amputation    Consults: Orthopedics/Podiatry     PHYSICAL EXAM   Visit Vitals  /67   Pulse 67   Temp 97.7 °F (36.5 °C) (Oral)   Resp 18   Ht 5' 2\" (1.575 m)   Wt 156 lb 1.4 oz (70.8 kg)   SpO2 99%   BMI 28.55 kg/m²     General: Awake, cooperative, no acute distress    HEENT: NC, Atraumatic. PERRLA, EOMI. Anicteric sclerae. Lungs:  CTA Bilaterally. No Wheezing/Rhonchi/Rales. Heart:  Regular  rhythm,  No murmur, No Rubs, No Gallops  Abdomen: Soft, Non distended, Non tender. +Bowel sounds,   Extremities: No c/c/e  Psych:   Not anxious or agitated. Neurologic:  No acute neurological deficits. Admission HPI : Theresa Holt is a 80 y.o. female has a history of glaucoma, hypertension and aortic valve stenosis. Patient also has a history of psoriatic arthritis and takes Humira every Friday. Patient is also status post amputation of left toe previously. Presents to the emergency room today because she was sent by her podiatrist Dr. Oscar Yeager and sports medicine because she has a right second toe that has osteomyelitis. He wants the patient admitted to medicine team for cardiac clearance because he would like to take the patient to the OR tomorrow to amputate her second right toe. Been following him her outpatient and sent her in today. He requested that the ED team start the patient on IV vancomycin. Discussion with the patient and her daughter revealed that she is under the care of of [facility] cardiology. She used to be seen by her previous cardiologist who recently retired and now she says that her cardiologist is Dr. Jaime Hamlin and she was last seen by GUERO Fisher. She has a diagnosis of nonrheumatic aortic valve stenosis and just had an echocardiogram on 2/10/2023. Hospital Course :     Admitted for osteomyelitis, s/p right second toe amputation. Right second toe osteomyelitis   S/p right foot 2nd digit amputation  Per ID: If surgeon confirms OM resected completely, can DC pt off antibiotics, per podiatry no clinical signs of infection remain, early culture results are negative for growth, pathology results pending, patient is stable for discharge from podiatry perspective, f/u as OP for post-operative care   No antibiotics on DC as surgical resection was cure.        Chronic glaucoma  Continue home eye drop , tolerated well      Hypertension  Continue home medication     Aortic valve stenosis, moderate  Continue seeing cardiologist as out-pt      Psoriatic arthritis     Previous amputation of left toe       Activity: activity as tolerated    Diet: cardiac diet    Follow-up: Dr. Lexy Vazquez, call for appt     Disposition: home with family support and Home PT    Minutes spent on discharge: 40 minutes       Labs: Results:       Chemistry No results for input(s): GLUCOSE, NA, K, CL, CO2, BUN, CREATININE, CALCIUM, AGAP, BILITOT, AST, ALT, ALKPHOS, PROT, GLOB, AGRATIO, LABGLOM, GFRAA in the last 72 hours. Invalid input(s): Norm Lips     CBC w/Diff No results for input(s): WBC, RBC, HGB, HCT, PLT, GRAN, LYMPHOPCT in the last 72 hours. Invalid input(s): EOS   Cardiac Enzymes No results for input(s): CKTOTAL, CKMB, CKMBINDEX, TROPONINI, MEGHNA in the last 72 hours. Coagulation No results for input(s): PROTIME, INR, APTT in the last 72 hours. Lipid Panel No results found for: CHOL, TRIG, HDL, LDLCHOLESTEROL, LDLCALC, LABVLDL, VLDL, CHOLHDLRATIO   BNP No results for input(s): BNP in the last 72 hours. Liver Enzymes No results for input(s): ALT, AST, GGT, ALKPHOS, BILITOT, BILIDIR in the last 72 hours. Thyroid Studies No results found for: J7XSFHW, K1TLWYS, FT3, T4FREE, TSH         Significant Diagnostic Studies: No results found. No results found for this or any previous visit.            CC: No Naidu MD

## 2024-01-11 ENCOUNTER — HOSPITAL ENCOUNTER (OUTPATIENT)
Facility: HOSPITAL | Age: 89
Discharge: HOME | End: 2024-01-11
Attending: HOSPITALIST
Payer: MEDICARE

## 2024-01-11 VITALS
HEART RATE: 69 BPM | TEMPERATURE: 98.3 F | OXYGEN SATURATION: 100 % | DIASTOLIC BLOOD PRESSURE: 56 MMHG | SYSTOLIC BLOOD PRESSURE: 154 MMHG | RESPIRATION RATE: 16 BRPM

## 2024-01-11 DIAGNOSIS — S81.802A OPEN WOUND OF LEFT LOWER LEG, INITIAL ENCOUNTER: Primary | ICD-10-CM

## 2024-01-11 DIAGNOSIS — I73.9 PVD (PERIPHERAL VASCULAR DISEASE) (HCC): ICD-10-CM

## 2024-01-11 PROCEDURE — 6370000000 HC RX 637 (ALT 250 FOR IP): Performed by: HOSPITALIST

## 2024-01-11 RX ORDER — BACITRACIN ZINC AND POLYMYXIN B SULFATE 500; 1000 [USP'U]/G; [USP'U]/G
OINTMENT TOPICAL ONCE
OUTPATIENT
Start: 2024-01-11 | End: 2024-01-11

## 2024-01-11 RX ORDER — SODIUM CHLOR/HYPOCHLOROUS ACID 0.033 %
SOLUTION, IRRIGATION IRRIGATION ONCE
Status: CANCELLED | OUTPATIENT
Start: 2024-01-11 | End: 2024-01-11

## 2024-01-11 RX ORDER — LIDOCAINE 40 MG/G
CREAM TOPICAL ONCE
Status: CANCELLED | OUTPATIENT
Start: 2024-01-11 | End: 2024-01-11

## 2024-01-11 RX ORDER — BETAMETHASONE DIPROPIONATE 0.5 MG/G
CREAM TOPICAL ONCE
Status: CANCELLED | OUTPATIENT
Start: 2024-01-11 | End: 2024-01-11

## 2024-01-11 RX ORDER — TRIAMCINOLONE ACETONIDE 1 MG/G
OINTMENT TOPICAL ONCE
OUTPATIENT
Start: 2024-01-11 | End: 2024-01-11

## 2024-01-11 RX ORDER — GENTAMICIN SULFATE 1 MG/G
OINTMENT TOPICAL ONCE
Status: CANCELLED | OUTPATIENT
Start: 2024-01-11 | End: 2024-01-11

## 2024-01-11 RX ORDER — LIDOCAINE HYDROCHLORIDE 20 MG/ML
JELLY TOPICAL ONCE
Status: COMPLETED | OUTPATIENT
Start: 2024-01-11 | End: 2024-01-11

## 2024-01-11 RX ORDER — CLOBETASOL PROPIONATE 0.5 MG/G
OINTMENT TOPICAL ONCE
Status: CANCELLED | OUTPATIENT
Start: 2024-01-11 | End: 2024-01-11

## 2024-01-11 RX ORDER — LIDOCAINE HYDROCHLORIDE 40 MG/ML
SOLUTION TOPICAL ONCE
OUTPATIENT
Start: 2024-01-11 | End: 2024-01-11

## 2024-01-11 RX ORDER — GINSENG 100 MG
CAPSULE ORAL ONCE
Status: CANCELLED | OUTPATIENT
Start: 2024-01-11 | End: 2024-01-11

## 2024-01-11 RX ORDER — SODIUM CHLOR/HYPOCHLOROUS ACID 0.033 %
SOLUTION, IRRIGATION IRRIGATION ONCE
OUTPATIENT
Start: 2024-01-11 | End: 2024-01-11

## 2024-01-11 RX ORDER — LIDOCAINE HYDROCHLORIDE 20 MG/ML
JELLY TOPICAL ONCE
OUTPATIENT
Start: 2024-01-11 | End: 2024-01-11

## 2024-01-11 RX ORDER — LIDOCAINE 40 MG/G
CREAM TOPICAL ONCE
OUTPATIENT
Start: 2024-01-11 | End: 2024-01-11

## 2024-01-11 RX ORDER — IBUPROFEN 200 MG
TABLET ORAL ONCE
Status: CANCELLED | OUTPATIENT
Start: 2024-01-11 | End: 2024-01-11

## 2024-01-11 RX ORDER — BETAMETHASONE DIPROPIONATE 0.5 MG/G
CREAM TOPICAL ONCE
OUTPATIENT
Start: 2024-01-11 | End: 2024-01-11

## 2024-01-11 RX ORDER — BACITRACIN ZINC AND POLYMYXIN B SULFATE 500; 1000 [USP'U]/G; [USP'U]/G
OINTMENT TOPICAL ONCE
Status: CANCELLED | OUTPATIENT
Start: 2024-01-11 | End: 2024-01-11

## 2024-01-11 RX ORDER — LIDOCAINE HYDROCHLORIDE 20 MG/ML
JELLY TOPICAL ONCE
Status: CANCELLED | OUTPATIENT
Start: 2024-01-11 | End: 2024-01-11

## 2024-01-11 RX ORDER — CLOBETASOL PROPIONATE 0.5 MG/G
OINTMENT TOPICAL ONCE
OUTPATIENT
Start: 2024-01-11 | End: 2024-01-11

## 2024-01-11 RX ORDER — IBUPROFEN 200 MG
TABLET ORAL ONCE
OUTPATIENT
Start: 2024-01-11 | End: 2024-01-11

## 2024-01-11 RX ORDER — GENTAMICIN SULFATE 1 MG/G
OINTMENT TOPICAL ONCE
OUTPATIENT
Start: 2024-01-11 | End: 2024-01-11

## 2024-01-11 RX ORDER — LIDOCAINE HYDROCHLORIDE 40 MG/ML
SOLUTION TOPICAL ONCE
Status: CANCELLED | OUTPATIENT
Start: 2024-01-11 | End: 2024-01-11

## 2024-01-11 RX ORDER — LIDOCAINE 50 MG/G
OINTMENT TOPICAL ONCE
Status: CANCELLED | OUTPATIENT
Start: 2024-01-11 | End: 2024-01-11

## 2024-01-11 RX ORDER — GINSENG 100 MG
CAPSULE ORAL ONCE
OUTPATIENT
Start: 2024-01-11 | End: 2024-01-11

## 2024-01-11 RX ORDER — TRIAMCINOLONE ACETONIDE 1 MG/G
OINTMENT TOPICAL ONCE
Status: CANCELLED | OUTPATIENT
Start: 2024-01-11 | End: 2024-01-11

## 2024-01-11 RX ORDER — LIDOCAINE 50 MG/G
OINTMENT TOPICAL ONCE
OUTPATIENT
Start: 2024-01-11 | End: 2024-01-11

## 2024-01-11 RX ADMIN — LIDOCAINE HYDROCHLORIDE: 20 JELLY TOPICAL at 16:23

## 2024-01-11 NOTE — FLOWSHEET NOTE
01/11/24 1351   Wound 01/11/24 Ankle Left;Lateral   Date First Assessed: 01/11/24   Present on Original Admission: Yes  Wound Approximate Age at First Assessment (Weeks): 3 weeks  Primary Wound Type: Venous Ulcer  Location: Ankle  Wound Location Orientation: Left;Lateral   Wound Image    Wound Etiology Venous   Dressing Status New dressing applied   Wound Cleansed Wound cleanser   Dressing/Treatment Collagen with Ag;Alginate;Silicone border  (tubigrip size E)   Dressing Change Due 01/18/24   Wound Length (cm) 1.5 cm   Wound Width (cm) 0.9 cm   Wound Depth (cm) 0.3 cm   Wound Surface Area (cm^2) 1.35 cm^2   Wound Volume (cm^3) 0.405 cm^3   Post-Procedure Length (cm) 1.7 cm   Post-Procedure Width (cm) 1.3 cm   Post-Procedure Depth (cm) 0.4 cm   Post-Procedure Surface Area (cm^2) 2.21 cm^2   Post-Procedure Volume (cm^3) 0.884 cm^3   Wound Assessment Slough   Drainage Amount Moderate (25-50%)   Drainage Description Serous   Odor None   Kathy-wound Assessment Dry/flaky   Margins Defined edges   Wound Thickness Description not for Pressure Injury Full thickness

## 2024-01-12 PROBLEM — I73.9 PVD (PERIPHERAL VASCULAR DISEASE) (HCC): Status: ACTIVE | Noted: 2024-01-12

## 2024-01-12 PROBLEM — I73.9 PVD (PERIPHERAL VASCULAR DISEASE) (HCC): Status: RESOLVED | Noted: 2024-01-12 | Resolved: 2024-01-12

## 2024-01-12 NOTE — WOUND CARE
[facility]   History and Physical Note     Angela Lucero  MEDICAL RECORD NUMBER:  [id number]  AGE: 89 y.o.   GENDER: female  : 1931  EPISODE DATE:  2024    Subjective:     Left lower leg open wound        HISTORY of PRESENT ILLNESS HPI     Angela Lucero is a 89 y.o. female who presents today for an initial evaluation of a wound/ulcer. Patient is new to the wound center. Wound duration:  3 week(s).    History of Wound Context: she reports she has history of psoriasis, she may have scratched her left lower leg resulting in open wound. She was seen by PCP and she was given antibiotics which she completed course. She continued to have open wound. she had left 2nd toe removed secondary to osteomyelitis. She has good distal pulses.     Pertinent associated symptoms: skin discoloration    PAST MEDICAL HISTORY        Diagnosis Date    Arthritis     Heart murmur     HTN (hypertension)     Psoriasis     PVD (peripheral vascular disease) (HCC)        PAST SURGICAL HISTORY    Past Surgical History:   Procedure Laterality Date    EYE SURGERY      HYSTERECTOMY (CERVIX STATUS UNKNOWN)      JOINT REPLACEMENT      TOE AMPUTATION Right 2023    RIGHT FOOT 2ND TOE AMPUTATION PT IN ROOM #328 performed by Jean Lo DPM at [facility] MAIN OR       FAMILY HISTORY    No family history on file.    SOCIAL HISTORY         ALLERGIES    Allergies   Allergen Reactions    Ace Inhibitors Anaphylaxis     Other reaction(s): anaphylaxis/angioedema      Cephalexin Rash and Other (See Comments)    Gabapentin Other (See Comments)     Other reaction(s): gi distress  Nausea at 300 mg dose.   Nausea at 300 mg dose.       Iodinated Contrast Media Other (See Comments)     X-Ray Dye  Other reaction(s): unknown      Iodine Other (See Comments)    Timolol      Shortness of breath       MEDICATIONS    Current Outpatient Medications on File Prior to Encounter   Medication Sig Dispense Refill

## 2024-01-18 ENCOUNTER — HOSPITAL ENCOUNTER (OUTPATIENT)
Facility: HOSPITAL | Age: 89
Discharge: HOME | End: 2024-01-18
Attending: HOSPITALIST
Payer: MEDICARE

## 2024-01-18 VITALS
DIASTOLIC BLOOD PRESSURE: 58 MMHG | RESPIRATION RATE: 18 BRPM | OXYGEN SATURATION: 100 % | HEART RATE: 59 BPM | TEMPERATURE: 97.4 F | SYSTOLIC BLOOD PRESSURE: 146 MMHG

## 2024-01-18 DIAGNOSIS — S81.802A OPEN WOUND OF LEFT LOWER LEG, INITIAL ENCOUNTER: Primary | ICD-10-CM

## 2024-01-18 PROCEDURE — 11042 DBRDMT SUBQ TIS 1ST 20SQCM/<: CPT

## 2024-01-18 PROCEDURE — 6370000000 HC RX 637 (ALT 250 FOR IP): Performed by: HOSPITALIST

## 2024-01-18 RX ORDER — LIDOCAINE 50 MG/G
OINTMENT TOPICAL ONCE
OUTPATIENT
Start: 2024-01-18 | End: 2024-01-18

## 2024-01-18 RX ORDER — IBUPROFEN 200 MG
TABLET ORAL ONCE
OUTPATIENT
Start: 2024-01-18 | End: 2024-01-18

## 2024-01-18 RX ORDER — LIDOCAINE HYDROCHLORIDE 40 MG/ML
SOLUTION TOPICAL ONCE
OUTPATIENT
Start: 2024-01-18 | End: 2024-01-18

## 2024-01-18 RX ORDER — LIDOCAINE HYDROCHLORIDE 20 MG/ML
JELLY TOPICAL ONCE
OUTPATIENT
Start: 2024-01-18 | End: 2024-01-18

## 2024-01-18 RX ORDER — SODIUM CHLOR/HYPOCHLOROUS ACID 0.033 %
SOLUTION, IRRIGATION IRRIGATION ONCE
OUTPATIENT
Start: 2024-01-18 | End: 2024-01-18

## 2024-01-18 RX ORDER — BETAMETHASONE DIPROPIONATE 0.5 MG/G
CREAM TOPICAL ONCE
OUTPATIENT
Start: 2024-01-18 | End: 2024-01-18

## 2024-01-18 RX ORDER — BACITRACIN ZINC AND POLYMYXIN B SULFATE 500; 1000 [USP'U]/G; [USP'U]/G
OINTMENT TOPICAL ONCE
OUTPATIENT
Start: 2024-01-18 | End: 2024-01-18

## 2024-01-18 RX ORDER — TRIAMCINOLONE ACETONIDE 1 MG/G
OINTMENT TOPICAL ONCE
OUTPATIENT
Start: 2024-01-18 | End: 2024-01-18

## 2024-01-18 RX ORDER — LIDOCAINE HYDROCHLORIDE 20 MG/ML
JELLY TOPICAL ONCE
Status: COMPLETED | OUTPATIENT
Start: 2024-01-18 | End: 2024-01-18

## 2024-01-18 RX ORDER — GENTAMICIN SULFATE 1 MG/G
OINTMENT TOPICAL ONCE
OUTPATIENT
Start: 2024-01-18 | End: 2024-01-18

## 2024-01-18 RX ORDER — CLOBETASOL PROPIONATE 0.5 MG/G
OINTMENT TOPICAL ONCE
OUTPATIENT
Start: 2024-01-18 | End: 2024-01-18

## 2024-01-18 RX ORDER — LIDOCAINE 40 MG/G
CREAM TOPICAL ONCE
OUTPATIENT
Start: 2024-01-18 | End: 2024-01-18

## 2024-01-18 RX ORDER — GINSENG 100 MG
CAPSULE ORAL ONCE
OUTPATIENT
Start: 2024-01-18 | End: 2024-01-18

## 2024-01-18 RX ADMIN — LIDOCAINE HYDROCHLORIDE: 20 JELLY TOPICAL at 13:28

## 2024-01-18 NOTE — FLOWSHEET NOTE
01/18/24 1327   Wound 01/11/24 Ankle Left;Lateral   Date First Assessed: 01/11/24   Present on Original Admission: Yes  Wound Approximate Age at First Assessment (Weeks): 3 weeks  Primary Wound Type: Venous Ulcer  Location: Ankle  Wound Location Orientation: Left;Lateral   Wound Image     Wound Etiology Other   Dressing Status Intact   Wound Cleansed Wound cleanser   Dressing/Treatment Collagen with Ag;Silicone border;Silver dressing;Tubular bandage   Offloading for Diabetic Foot Ulcers Offloading not required   Dressing Change Due 01/31/24   Wound Length (cm) 1.5 cm   Wound Width (cm) 1.1 cm   Wound Depth (cm) 0.4 cm   Wound Surface Area (cm^2) 1.65 cm^2   Change in Wound Size % (l*w) -22.22   Wound Volume (cm^3) 0.66 cm^3   Wound Healing % -63   Post-Procedure Length (cm) 1.7 cm   Post-Procedure Width (cm) 1.3 cm   Post-Procedure Depth (cm) 0.5 cm   Post-Procedure Surface Area (cm^2) 2.21 cm^2   Post-Procedure Volume (cm^3) 1.105 cm^3   Wound Assessment Slough   Drainage Amount Small (< 25%)   Drainage Description Serosanguinous   Odor None   Kathy-wound Assessment Intact   Margins Defined edges   Wound Thickness Description not for Pressure Injury Full thickness

## 2024-01-19 ENCOUNTER — HOME HEALTH ADMISSION (OUTPATIENT)
Age: 89
End: 2024-01-19
Payer: MEDICARE

## 2024-01-19 NOTE — PLAN OF CARE
Discharge Instructions from  [facility]  [facility]  [address] [place] [address]  [phone] Fax [phone]    NAME:  Angela Lucero  YOB: 1931  MEDICAL RECORD NUMBER:  [id number]  DATE:  1/18/2024      [facility]      Wound Cleansing:   Do not scrub or use excessive force.  Cleanse wound prior to applying a clean dressing with:  [x] Normal Saline or    [x] Wound Cleanser            Dressings:           Wound Location Left lateral ankle   [x] Apply Primary Dressing:       [] MediHoney Gel [] Alginate with Silver [] Alginate   [] Collagen [x] Collagen with Silver   [] Santyl with Moisten saline gauze     [] Hydrocolloid   [] MediHoney Alginate [] Foam with Silver   [] Foam   [] Hydrofera Blue    [] Mepilex Border    [] Moisten with Saline [] Hydrogel [] Mepitel     [] Bactroban/Mupirocin [] Polysporin  [] Other:    [] Pack wound loosely with  [] Iodoform   [] Plain Packing  [] Other   [x] Cover and Secure with:     [] Gauze [] Kade [] Kerlix   [] Ace Wrap [] Cover Roll Tape [] ABD     [x] Other: Mepilex border or equivalent   Avoid contact of tape with skin.  [x] Change dressing:  [x] Two times per week except on week she comes to wound clinic (Comes to wound clinic every 2 weeks)            Edema Control:  Apply: [] Compression Stocking []Right Leg []Left Leg   [x] Tubigrip []Right Leg Double Layer []Left Leg Double Layer       []Right Leg Single Layer [x]Left Leg Single Layer   [] SpandaGrip []Right Leg  []Left Leg      []Low compression 5-10 mm/Hg      []Medium compression 10-20 mm/Hg     []High compression  20-30 mm/Hg   every morning immediately when getting up should be applied to affected leg(s) from mid foot to knee making sure to cover the heel.  Remove every night before going to bed.   [x] Elevate leg(s) above the level of the heart when sitting.    [x] Avoid prolonged standing in one place.        Dietary:  [x] Diet as

## 2024-01-20 ENCOUNTER — HOME CARE VISIT (OUTPATIENT)
Age: 89
End: 2024-01-20

## 2024-01-20 VITALS
OXYGEN SATURATION: 98 % | DIASTOLIC BLOOD PRESSURE: 70 MMHG | TEMPERATURE: 97.7 F | RESPIRATION RATE: 16 BRPM | HEART RATE: 70 BPM | SYSTOLIC BLOOD PRESSURE: 130 MMHG

## 2024-01-20 PROCEDURE — 0221000100 HH NO PAY CLAIM PROCEDURE

## 2024-01-20 PROCEDURE — G0299 HHS/HOSPICE OF RN EA 15 MIN: HCPCS

## 2024-01-20 ASSESSMENT — ENCOUNTER SYMPTOMS: DYSPNEA ACTIVITY LEVEL: AFTER AMBULATING LESS THAN 10 FT

## 2024-01-20 NOTE — HOME HEALTH
Skilled services/Home bound verification:     Skilled Reason for admission/summary of clinical condition:  Patient reported that she has psorasis and was scratching an area on her left lateral ankle which opened the area and became infected. She has completed her antibiotics and currently is recieving wound care to area.  .  This patient is homebound for the following reasons Requires considerable and taxing effort to leave the home  and Requires the assistance of 1 or more persons to leave the home .    Caregiver: other.  Caregiver assists with errands and transportation.    Medications reconciled and all medications are available in the home this visit.      The following education was provided regarding medications: take all medications as prescribed  Medications  are effective at this time.      High risk medication teaching regarding anticoagulants, antiplatelets, antibiotics, antipsychotics, hypoglycemic agents, or opioid/narcotics performed (specify): n/a    MD notified of any discrepancies/look a like medications/medication interactions n/a.     Home health supplies by type and quantity ordered/delivered this visit include: wound cleanser, gauze, collagen wiht silver, foam dressing,     Patient education provided this visit to include: assess for s/s of infection and report immediately.      Patient level of understanding of education provided: verbalized understanding    Sharps Education Provided: n/a  Patient response to procedure performed:  no complaints     Home exercise program/Homework provided: deep breathing exercises     Pt/Caregiver instructed on plan of care and are agreeable to plan of care at this time.      Physician notified of patient admission to home health and plan of care including anticipated frequency of 2w9, 2 prn and treatments/interventions/modalities of wound care to left lateral ankle.    Discharge planning discussed with patient and caregiver.  Discharge planning as follows: when

## 2024-01-20 NOTE — WOUND CARE
[facility]   Medical Staff Progress Note     Angela Lucero  MEDICAL RECORD NUMBER:  [id number]  AGE: 89 y.o.   GENDER: female  : 1931  EPISODE DATE:  2024    Chief complaint and reason for visit:     Chief Complaint   Patient presents with    Wound Check      Patient presenting for follow up evaluation of wound(s) per chief complaint.      Subjective: Symptoms, wound related issues, or other pertinent wound history since last visit:     HISTORY of PRESENT ILLNESS HPI     Angela Lucero is a 89 y.o. female who presents today for wound/ulcer evaluation.     History of Wound Context: she reports she has history of psoriasis, she may have scratched her left lower leg resulting in open wound. She was seen by PCP and she was given antibiotics which she completed course. She continued to have open wound. she had left 2nd toe removed secondary to osteomyelitis. She has good distal pulses.        REVIEW OF SYSTEMS  A comprehensive review of systems was negative except for what has been indicated above and:     Objective:    BP (!) 146/58   Pulse 59   Temp 97.4 °F (36.3 °C) (Oral)   Resp 18   SpO2 100%   Wt Readings from Last 3 Encounters:   23 70.8 kg (156 lb 1.4 oz)       PHYSICAL EXAM  General: Alert and in no acute distress. Normal appearing  Skin: as described below  Head: Normocephalic and atraumatic  Eyes: Extraocular eye movements intact, conjunctivae normal, and sclera anicteric  ENT: Hearing grossly normal bilaterally. Normal appearance  Respiratory: no chest wall tenderness. no respiratory distress  GI: Abdomen non-tender and benign  Musculoskeletal: Baseline range of motion in joints. Nontender calves. No cyanosis. Edema negative.  Neurologic: Speech normal. At baseline without new focal deficits. Mental status normal or at baseline.    Medical Decision Making:   Assessment :   Open wound of left lower leg S81.802A     Medical Decision

## 2024-01-24 ENCOUNTER — HOME CARE VISIT (OUTPATIENT)
Age: 89
End: 2024-01-24
Payer: MEDICARE

## 2024-01-27 ENCOUNTER — HOME CARE VISIT (OUTPATIENT)
Age: 89
End: 2024-01-27
Payer: MEDICARE

## 2024-01-27 VITALS
TEMPERATURE: 98.1 F | SYSTOLIC BLOOD PRESSURE: 122 MMHG | RESPIRATION RATE: 16 BRPM | HEART RATE: 82 BPM | OXYGEN SATURATION: 98 % | DIASTOLIC BLOOD PRESSURE: 86 MMHG

## 2024-01-27 PROCEDURE — G0299 HHS/HOSPICE OF RN EA 15 MIN: HCPCS

## 2024-01-27 NOTE — HOME HEALTH
of Care: N/A.     The following discharge planning was discussed with the pt/caregiver: Patient will be discharged once education has completed, patient is medically stable and pt/cg are able to independently manage wound care/ wound has healed or no longer requires skilled care.

## 2024-01-30 ENCOUNTER — HOME CARE VISIT (OUTPATIENT)
Age: 89
End: 2024-01-30
Payer: MEDICARE

## 2024-01-30 VITALS
RESPIRATION RATE: 16 BRPM | DIASTOLIC BLOOD PRESSURE: 68 MMHG | TEMPERATURE: 97.3 F | SYSTOLIC BLOOD PRESSURE: 151 MMHG | HEART RATE: 63 BPM | OXYGEN SATURATION: 100 %

## 2024-01-30 PROCEDURE — G0299 HHS/HOSPICE OF RN EA 15 MIN: HCPCS

## 2024-01-30 ASSESSMENT — ENCOUNTER SYMPTOMS: PAIN LOCATION - PAIN QUALITY: INTERMITTENT

## 2024-01-30 NOTE — HOME HEALTH
Skilled reason for visit: Patient was recently hospitalized for L leg wound , requiring observation by a SN for s/s of decomposition or adverse effects resulting from newly prescribed medications.  Skilled observation needed to determine if new medication regimen prescribed requires modifications or other therapeutic interventions considered until pt's clinical condition or treatment has stabilized.  Caregiver involvement:  Patient's caregiver is her family . Caregiver assists patient with bathing, dressing, walking, bathroom, meal prep and setup, medication management, grocery shopping, household chores, transportation to MD appointment and home exercise program.  Medications reconciled and all medications are available in the home this visit.  The following education was provided regarding medications, medication interactions, and look alike modifications.   amLODIPine (NORVASC) 10 MG tablet        Contact Agency or MD with questions.  Medications are effective at this time.  Patient states understanding.    Patient education provided this visit:  .X.... Disease Management: Wound care teaching, pain management, s/s of infection.  See interventions for further teaching  Patient level of understanding of education provided: Pt verbalized understanding of all education and repeated back teaching   Skilled Care Performed this visit: Disease process teaching, medication teaching, physical assessment and monitoring  Patient response to procedure performed:  Pt verbalized satisfaction with wound care  Sharps Education Provided: NA  Goals/teaching progressing. Patient's goal is to have wound healing. Progressing toward goals. Patient has remained free from falls, free from infection; no safety concerns at this time and is ambulating independently.  SN to complete education of patient and patient to follow up with any further questions or concerns with Dr Meier  Home exercise program:  Fall risk prevention  Continued need

## 2024-01-31 ENCOUNTER — HOSPITAL ENCOUNTER (OUTPATIENT)
Facility: HOSPITAL | Age: 89
Discharge: HOME | End: 2024-01-31
Attending: HOSPITALIST
Payer: MEDICARE

## 2024-01-31 VITALS
SYSTOLIC BLOOD PRESSURE: 150 MMHG | TEMPERATURE: 98.4 F | RESPIRATION RATE: 16 BRPM | HEART RATE: 65 BPM | OXYGEN SATURATION: 100 % | DIASTOLIC BLOOD PRESSURE: 57 MMHG

## 2024-01-31 DIAGNOSIS — S81.802A OPEN WOUND OF LEFT LOWER LEG, INITIAL ENCOUNTER: Primary | ICD-10-CM

## 2024-01-31 PROCEDURE — 6370000000 HC RX 637 (ALT 250 FOR IP): Performed by: HOSPITALIST

## 2024-01-31 PROCEDURE — 11042 DBRDMT SUBQ TIS 1ST 20SQCM/<: CPT

## 2024-01-31 RX ORDER — GINSENG 100 MG
CAPSULE ORAL ONCE
OUTPATIENT
Start: 2024-01-31 | End: 2024-01-31

## 2024-01-31 RX ORDER — LIDOCAINE HYDROCHLORIDE 20 MG/ML
JELLY TOPICAL ONCE
OUTPATIENT
Start: 2024-01-31 | End: 2024-01-31

## 2024-01-31 RX ORDER — BETAMETHASONE DIPROPIONATE 0.5 MG/G
CREAM TOPICAL ONCE
OUTPATIENT
Start: 2024-01-31 | End: 2024-01-31

## 2024-01-31 RX ORDER — LIDOCAINE 40 MG/G
CREAM TOPICAL ONCE
OUTPATIENT
Start: 2024-01-31 | End: 2024-01-31

## 2024-01-31 RX ORDER — LIDOCAINE HYDROCHLORIDE 40 MG/ML
SOLUTION TOPICAL ONCE
OUTPATIENT
Start: 2024-01-31 | End: 2024-01-31

## 2024-01-31 RX ORDER — SODIUM CHLOR/HYPOCHLOROUS ACID 0.033 %
SOLUTION, IRRIGATION IRRIGATION ONCE
OUTPATIENT
Start: 2024-01-31 | End: 2024-01-31

## 2024-01-31 RX ORDER — GENTAMICIN SULFATE 1 MG/G
OINTMENT TOPICAL ONCE
OUTPATIENT
Start: 2024-01-31 | End: 2024-01-31

## 2024-01-31 RX ORDER — IBUPROFEN 200 MG
TABLET ORAL ONCE
OUTPATIENT
Start: 2024-01-31 | End: 2024-01-31

## 2024-01-31 RX ORDER — TRIAMCINOLONE ACETONIDE 1 MG/G
OINTMENT TOPICAL ONCE
OUTPATIENT
Start: 2024-01-31 | End: 2024-01-31

## 2024-01-31 RX ORDER — LIDOCAINE 50 MG/G
OINTMENT TOPICAL ONCE
OUTPATIENT
Start: 2024-01-31 | End: 2024-01-31

## 2024-01-31 RX ORDER — LIDOCAINE HYDROCHLORIDE 20 MG/ML
JELLY TOPICAL ONCE
Status: COMPLETED | OUTPATIENT
Start: 2024-01-31 | End: 2024-01-31

## 2024-01-31 RX ORDER — CLOBETASOL PROPIONATE 0.5 MG/G
OINTMENT TOPICAL ONCE
OUTPATIENT
Start: 2024-01-31 | End: 2024-01-31

## 2024-01-31 RX ORDER — BACITRACIN ZINC AND POLYMYXIN B SULFATE 500; 1000 [USP'U]/G; [USP'U]/G
OINTMENT TOPICAL ONCE
OUTPATIENT
Start: 2024-01-31 | End: 2024-01-31

## 2024-01-31 RX ADMIN — LIDOCAINE HYDROCHLORIDE: 20 JELLY TOPICAL at 16:30

## 2024-02-01 NOTE — FLOWSHEET NOTE
01/31/24 1342   Wound 01/11/24 Ankle Left;Lateral   Date First Assessed: 01/11/24   Present on Original Admission: Yes  Wound Approximate Age at First Assessment (Weeks): 3 weeks  Primary Wound Type: Venous Ulcer  Location: Ankle  Wound Location Orientation: Left;Lateral   Wound Image     Wound Etiology Other   Dressing Status New dressing applied   Wound Cleansed Irrigated with saline   Dressing/Treatment Collagen with Ag;Dry dressing;Silicone border;Tubular bandage  (tubigrip size E)   Offloading for Diabetic Foot Ulcers Offloading not required   Dressing Change Due 02/14/24   Wound Length (cm) 1.5 cm   Wound Width (cm) 1 cm   Wound Depth (cm) 0.1 cm   Wound Surface Area (cm^2) 1.5 cm^2   Change in Wound Size % (l*w) -11.11   Wound Volume (cm^3) 0.15 cm^3   Wound Healing % 63   Post-Procedure Length (cm) 1.6 cm   Post-Procedure Width (cm) 1.3 cm   Post-Procedure Depth (cm) 0.4 cm   Post-Procedure Surface Area (cm^2) 2.08 cm^2   Post-Procedure Volume (cm^3) 0.832 cm^3   Drainage Amount Small (< 25%)   Drainage Description Serous   Odor None   Kathy-wound Assessment Dry/flaky   Margins Defined edges   Wound Thickness Description not for Pressure Injury Full thickness

## 2024-02-01 NOTE — PLAN OF CARE
Discharge Instructions from  [facility]  [facility]  [address] [place] [address]  [phone] Fax [phone]    NAME:  Angela Lucero  YOB: 1931  MEDICAL RECORD NUMBER:  [id number]  DATE:  1/31/2024       [facility]        Wound Cleansing:   Do not scrub or use excessive force.  Cleanse wound prior to applying a clean dressing with:  [x] Normal Saline or    [x] Wound Cleanser                        Dressings:                  Wound Location Left lateral ankle   [x] Apply Primary Dressing:                                          [] MediHoney Gel      [] Alginate with Silver            [] Alginate              [] Collagen     [x] Collagen with Silver   [] Santyl with Moisten saline gauze                [] Hydrocolloid             [] MediHoney Alginate        [] Foam with Silver              [] Foam   [] Hydrofera Blue             [] Mepilex Border                    [] Moisten with Saline           [] Hydrogel     [] Mepitel                      [] Bactroban/Mupirocin         [] Polysporin              [] Other:    [] Pack wound loosely with   [] Iodoform   [] Plain Packing          [] Other   [x] Cover and Secure with:                   [] Gauze        [] Kade           [] Kerlix              [] Ace Wrap   [] Cover Roll Tape     [] ABD                                      [x] Other: Mepilex border or equivalent              Avoid contact of tape with skin.  [x] Change dressing:  [x] One time per week.  Pt coming to wound clinic on Monday 2/5 then the following 2 weeks will come to clinic on Thursdays.                                    Edema Control:  Apply:  [] Compression Stocking      []Right Leg     []Left Leg              [x] Tubigrip     []Right Leg Double Layer      []Left Leg Double Layer                                                  []Right Leg Single Layer       [x]Left Leg Single Layer              [] SpandaGrip            Detail Level: Zone Number Of Freeze-Thaw Cycles: 2 freeze-thaw cycles Post-Care Instructions: I reviewed with the patient in detail post-care instructions. Patient is to wear sunprotection, and avoid picking at any of the treated lesions. Pt may apply Vaseline to crusted or scabbing areas. Duration Of Freeze Thaw-Cycle (Seconds): 5 Render Note In Bullet Format When Appropriate: No Consent: The patient's consent was obtained including but not limited to risks of crusting, scabbing, blistering, scarring, darker or lighter pigmentary change, recurrence, incomplete removal and infection.

## 2024-02-02 NOTE — WOUND CARE
[facility]   Medical Staff Progress Note     Angela Lucero  MEDICAL RECORD NUMBER:  [id number]  AGE: 89 y.o.   GENDER: female  : 1931  EPISODE DATE:  2024    Chief complaint and reason for visit:     Chief Complaint   Patient presents with    Wound Check      Patient presenting for follow up evaluation of wound(s) per chief complaint.      Subjective: Symptoms, wound related issues, or other pertinent wound history since last visit:     HISTORY of PRESENT ILLNESS HPI     Angela Lucero is a 89 y.o. female who presents today for wound/ulcer evaluation.     History of Wound Context: she reports she has history of psoriasis, she may have scratched her left lower leg resulting in open wound. She was seen by PCP and she was given antibiotics which she completed course. She continued to have open wound. she had left 2nd toe removed secondary to osteomyelitis. She has good distal pulses.        REVIEW OF SYSTEMS  A comprehensive review of systems was negative except for what has been indicated above and:     Objective:    BP (!) 150/57   Pulse 65   Temp 98.4 °F (36.9 °C) (Oral)   Resp 16   SpO2 100%   Wt Readings from Last 3 Encounters:   23 70.8 kg (156 lb 1.4 oz)       PHYSICAL EXAM  General: Alert and in no acute distress. Normal appearing  Skin: as described below  Head: Normocephalic and atraumatic  Eyes: Extraocular eye movements intact, conjunctivae normal, and sclera anicteric  ENT: Hearing grossly normal bilaterally. Normal appearance  Respiratory: no chest wall tenderness. no respiratory distress  GI: Abdomen non-tender and benign  Musculoskeletal: Baseline range of motion in joints. Nontender calves. No cyanosis. Edema negative.  Neurologic: Speech normal. At baseline without new focal deficits. Mental status normal or at baseline.    Medical Decision Making:   Assessment :   Open wound of left lower leg S81.802A     Medical Decision

## 2024-02-03 ENCOUNTER — HOME CARE VISIT (OUTPATIENT)
Age: 89
End: 2024-02-03
Payer: MEDICARE

## 2024-02-03 VITALS
TEMPERATURE: 98.1 F | SYSTOLIC BLOOD PRESSURE: 138 MMHG | HEART RATE: 82 BPM | RESPIRATION RATE: 16 BRPM | OXYGEN SATURATION: 98 % | DIASTOLIC BLOOD PRESSURE: 70 MMHG

## 2024-02-03 PROCEDURE — G0299 HHS/HOSPICE OF RN EA 15 MIN: HCPCS

## 2024-02-04 NOTE — HOME HEALTH
Skilled reason for visit: left lower leg wound requiring wound care.   Caregiver involvement: daughter. Caregiver is able to assist with bathing, dressing, walking, turn in bed, bathroom, meal prep and setup, medication management, grocery shopping, household chores and transportation to MD appointment.   Medications reviewed and all medications are available in the home this visit. Patient denies any medication changes at this time of assessment.   The following education was provided regarding medications: patient instructed to continue to take medications as prescribed. patient aware to monitor for effectiveness and to notify staff of any adverse reactions to medications/any changes to medication regimen.   Medications are effective at this time.   Home health supplies by type and quantity ordered/delivered this visit include: none needed at this time- patient has adequate supplies.   Patient education provided this visit: patient/cg reminded to monitor for edema/increase in edema, to elevate extremity when edema occurs and to notify md if edema exceeds normal limits for patient, none noted at this time. patient made aware to monitor for s/s of infection [increased swelling, increased redness around site, increased pain, foul smelling drainage, fever] aware who to report to/when. no s/s of infection noted. encouraged patient to get three nutritional meals daily and to stay hydrated, drink plenty of fluids. Instructed patient/caregiver on low salt diet- patient made aware to restrict sodium intake to 2 g/day, to reduce sodium if weight begins to increase, educated on tips to limit sodium (avoiding foods high in sodium, getting rid of salt shaker, using herbs/spices, using fresh foods, avoiding salt substitutes which may contain potassium, buying foods labeled low-sodium or sodium free), reading food labels, and making changes slowly to give taste buds time to adjust. discussed importance of monitoring blood

## 2024-02-05 ENCOUNTER — HOSPITAL ENCOUNTER (OUTPATIENT)
Facility: HOSPITAL | Age: 89
Discharge: HOME | End: 2024-02-05
Attending: HOSPITALIST
Payer: MEDICARE

## 2024-02-05 VITALS
SYSTOLIC BLOOD PRESSURE: 169 MMHG | HEART RATE: 63 BPM | DIASTOLIC BLOOD PRESSURE: 52 MMHG | OXYGEN SATURATION: 100 % | RESPIRATION RATE: 18 BRPM | TEMPERATURE: 98.5 F

## 2024-02-05 DIAGNOSIS — S81.802A OPEN WOUND OF LEFT LOWER LEG, INITIAL ENCOUNTER: Primary | ICD-10-CM

## 2024-02-05 PROCEDURE — 6370000000 HC RX 637 (ALT 250 FOR IP): Performed by: HOSPITALIST

## 2024-02-05 PROCEDURE — 11042 DBRDMT SUBQ TIS 1ST 20SQCM/<: CPT

## 2024-02-05 RX ORDER — TRIAMCINOLONE ACETONIDE 1 MG/G
OINTMENT TOPICAL ONCE
OUTPATIENT
Start: 2024-02-05 | End: 2024-02-05

## 2024-02-05 RX ORDER — BACITRACIN ZINC AND POLYMYXIN B SULFATE 500; 1000 [USP'U]/G; [USP'U]/G
OINTMENT TOPICAL ONCE
OUTPATIENT
Start: 2024-02-05 | End: 2024-02-05

## 2024-02-05 RX ORDER — BETAMETHASONE DIPROPIONATE 0.5 MG/G
CREAM TOPICAL ONCE
OUTPATIENT
Start: 2024-02-05 | End: 2024-02-05

## 2024-02-05 RX ORDER — LIDOCAINE HYDROCHLORIDE 40 MG/ML
SOLUTION TOPICAL ONCE
OUTPATIENT
Start: 2024-02-05 | End: 2024-02-05

## 2024-02-05 RX ORDER — LIDOCAINE HYDROCHLORIDE 20 MG/ML
JELLY TOPICAL ONCE
OUTPATIENT
Start: 2024-02-05 | End: 2024-02-05

## 2024-02-05 RX ORDER — IBUPROFEN 200 MG
TABLET ORAL ONCE
OUTPATIENT
Start: 2024-02-05 | End: 2024-02-05

## 2024-02-05 RX ORDER — LIDOCAINE 40 MG/G
CREAM TOPICAL ONCE
OUTPATIENT
Start: 2024-02-05 | End: 2024-02-05

## 2024-02-05 RX ORDER — LIDOCAINE HYDROCHLORIDE 20 MG/ML
JELLY TOPICAL ONCE
Status: COMPLETED | OUTPATIENT
Start: 2024-02-05 | End: 2024-02-05

## 2024-02-05 RX ORDER — GINSENG 100 MG
CAPSULE ORAL ONCE
OUTPATIENT
Start: 2024-02-05 | End: 2024-02-05

## 2024-02-05 RX ORDER — GENTAMICIN SULFATE 1 MG/G
OINTMENT TOPICAL ONCE
OUTPATIENT
Start: 2024-02-05 | End: 2024-02-05

## 2024-02-05 RX ORDER — LIDOCAINE 50 MG/G
OINTMENT TOPICAL ONCE
OUTPATIENT
Start: 2024-02-05 | End: 2024-02-05

## 2024-02-05 RX ORDER — SODIUM CHLOR/HYPOCHLOROUS ACID 0.033 %
SOLUTION, IRRIGATION IRRIGATION ONCE
OUTPATIENT
Start: 2024-02-05 | End: 2024-02-05

## 2024-02-05 RX ORDER — CLOBETASOL PROPIONATE 0.5 MG/G
OINTMENT TOPICAL ONCE
OUTPATIENT
Start: 2024-02-05 | End: 2024-02-05

## 2024-02-05 RX ADMIN — LIDOCAINE HYDROCHLORIDE: 20 JELLY TOPICAL at 14:24

## 2024-02-05 NOTE — PLAN OF CARE
Discharge Instructions from  [facility]  [facility]  [address] [place] [address]  [phone] Fax [phone]    NAME:  Angela Lucero  YOB: 1931  MEDICAL RECORD NUMBER:  [id number]  DATE:  2/5/2024    [facility]        Wound Cleansing:   Do not scrub or use excessive force.  Cleanse wound prior to applying a clean dressing with:  [x] Normal Saline or    [x] Wound Cleanser                        Dressings:                  Wound Location Left lateral ankle   [x] Apply Primary Dressing:                                          [] MediHoney Gel      [] Alginate with Silver            [] Alginate              [] Collagen     [] Collagen with Silver   [] Santyl with Moisten saline gauze                [] Hydrocolloid             [] MediHoney Alginate        [] Foam with Silver              [] Foam   [] Hydrofera Blue             [] Mepilex Border                    [] Moisten with Saline           [] Hydrogel     [] Mepitel                      [] Bactroban/Mupirocin         [] Polysporin              [x] Other:  Mesalt  [] Pack wound loosely with   [] Iodoform   [] Plain Packing          [] Other:   [x] Cover and Secure with:                   [] Gauze        [] Kade           [] Kerlix              [] Ace Wrap   [] Cover Roll Tape     [] ABD                                      [x] Other: Mepilex border or equivalent              Avoid contact of tape with skin.  [x] Change dressing:  [x] One time per week.  Please see pt on Thursday 2/8 followed by Monday 2/12, pt will come to wound clinic on Thursday 2/15.                Edema Control:  Apply:  [] Compression Stocking      []Right Leg     []Left Leg              [x] Tubigrip     []Right Leg Double Layer      []Left Leg Double Layer                                                  []Right Leg Single Layer       [x]Left Leg Single Layer              [] SpandaGrip           []Right Leg

## 2024-02-05 NOTE — FLOWSHEET NOTE
02/05/24 1347   Wound 01/11/24 Ankle Left;Lateral   Date First Assessed: 01/11/24   Present on Original Admission: Yes  Wound Approximate Age at First Assessment (Weeks): 3 weeks  Primary Wound Type: Venous Ulcer  Location: Ankle  Wound Location Orientation: Left;Lateral   Wound Image   (post debridement picture not saved)   Wound Etiology Other   Dressing Status Intact   Wound Cleansed Cleansed with saline   Dressing/Treatment Silicone border  (mesalt)   Offloading for Diabetic Foot Ulcers Offloading not required   Dressing Change Due 02/15/24   Wound Length (cm) 1.5 cm   Wound Width (cm) 1 cm   Wound Depth (cm) 0.3 cm   Wound Surface Area (cm^2) 1.5 cm^2   Change in Wound Size % (l*w) -11.11   Wound Volume (cm^3) 0.45 cm^3   Wound Healing % -11   Post-Procedure Length (cm) 1.6 cm   Post-Procedure Width (cm) 1.2 cm   Post-Procedure Depth (cm) 0.5 cm   Post-Procedure Surface Area (cm^2) 1.92 cm^2   Post-Procedure Volume (cm^3) 0.96 cm^3   Wound Assessment Devitalized tissue   Drainage Amount Small (< 25%)   Drainage Description Serosanguinous   Odor None   Kathy-wound Assessment Intact   Margins Defined edges   Wound Thickness Description not for Pressure Injury Full thickness     Tubigrip size E applied

## 2024-02-06 ENCOUNTER — HOME CARE VISIT (OUTPATIENT)
Age: 89
End: 2024-02-06
Payer: MEDICARE

## 2024-02-06 NOTE — WOUND CARE
[facility]   Medical Staff Progress Note     Angela Lucero  MEDICAL RECORD NUMBER:  [id number]  AGE: 89 y.o.   GENDER: female  : 1931  EPISODE DATE:  2024    Chief complaint and reason for visit:     Chief Complaint   Patient presents with    Wound Check      Patient presenting for follow up evaluation of wound(s) per chief complaint.      Subjective: Symptoms, wound related issues, or other pertinent wound history since last visit:     HISTORY of PRESENT ILLNESS HPI     Angela Lucero is a 89 y.o. female who presents today for wound/ulcer evaluation.     History of Wound Context: she reports she has history of psoriasis, she may have scratched her left lower leg resulting in open wound. She was seen by PCP and she was given antibiotics which she completed course. She continued to have open wound. she had left 2nd toe removed secondary to osteomyelitis. She has good distal pulses.        REVIEW OF SYSTEMS  A comprehensive review of systems was negative except for what has been indicated above and:     Objective:    BP (!) 150/57   Pulse 65   Temp 98.4 °F (36.9 °C) (Oral)   Resp 16   SpO2 100%   Wt Readings from Last 3 Encounters:   23 70.8 kg (156 lb 1.4 oz)       PHYSICAL EXAM  General: Alert and in no acute distress. Normal appearing  Skin: as described below  Head: Normocephalic and atraumatic  Eyes: Extraocular eye movements intact, conjunctivae normal, and sclera anicteric  ENT: Hearing grossly normal bilaterally. Normal appearance  Respiratory: no chest wall tenderness. no respiratory distress  GI: Abdomen non-tender and benign  Musculoskeletal: Baseline range of motion in joints. Nontender calves. No cyanosis. Edema negative.  Neurologic: Speech normal. At baseline without new focal deficits. Mental status normal or at baseline.    Medical Decision Making:   Assessment :   Open wound of left lower leg S81.802A     Medical Decision

## 2024-02-14 ENCOUNTER — HOME CARE VISIT (OUTPATIENT)
Age: 89
End: 2024-02-14
Payer: MEDICARE

## 2024-02-14 PROCEDURE — G0299 HHS/HOSPICE OF RN EA 15 MIN: HCPCS

## 2024-02-15 VITALS
SYSTOLIC BLOOD PRESSURE: 137 MMHG | DIASTOLIC BLOOD PRESSURE: 67 MMHG | TEMPERATURE: 97.1 F | OXYGEN SATURATION: 99 % | RESPIRATION RATE: 16 BRPM

## 2024-02-15 NOTE — HOME HEALTH
Skilled reason for visit: Patient was recently hospitalized for L leg wound , requiring observation by a SN for s/s of decomposition or adverse effects resulting from newly prescribed medications.  Skilled observation needed to determine if new medication regimen prescribed requires modifications or other therapeutic interventions considered until pt's clinical condition or treatment has stabilized.  Caregiver involvement:  Patient's caregiver is her family. Caregiver assists patient with bathing, dressing, walking, bathroom, meal prep and setup, medication management, grocery shopping, household chores, transportation to MD appointment and home exercise program.  Medications reconciled and all medications are available in the home this visit.  The following education was provided regarding medications, medication interactions, and look alike modifications.   amLODIPine (NORVASC) 10 MG tablet        Contact Agency or MD with questions.  Medications are effective at this time.  Patient states understanding.    Patient education provided this visit:  .X.... Disease Management: Wound healing teaching, pain management.  See interventions for further teaching  Patient level of understanding of education provided: Pt verbalized understanding of all education and repeated back teaching   Skilled Care Performed this visit: Disease process teaching, medication teaching, physical assessment and monitoring  Patient response to procedure performed:  Pt verbalized satisfaction with wound care  Sharps Education Provided: NA  Goals/teaching progressing. Patient's goal is to have wound healing. Progressing toward goals. Patient has remained free from falls, free from infection; no safety concerns at this time and is ambulating independently.  SN to complete education of patient and patient to follow up with any further questions or concerns with Dr Meier  Home exercise program:  Fall risk prevention  Continued need for the following

## 2024-02-17 ENCOUNTER — HOME CARE VISIT (OUTPATIENT)
Age: 89
End: 2024-02-17
Payer: MEDICARE

## 2024-02-17 PROCEDURE — G0299 HHS/HOSPICE OF RN EA 15 MIN: HCPCS

## 2024-02-19 VITALS
SYSTOLIC BLOOD PRESSURE: 118 MMHG | DIASTOLIC BLOOD PRESSURE: 52 MMHG | HEART RATE: 60 BPM | RESPIRATION RATE: 18 BRPM | OXYGEN SATURATION: 100 % | TEMPERATURE: 98.5 F

## 2024-02-19 ASSESSMENT — ENCOUNTER SYMPTOMS
DYSPNEA ACTIVITY LEVEL: AFTER AMBULATING 10 - 20 FT
PAIN LOCATION - PAIN QUALITY: SHARP, BURNING
STOOL DESCRIPTION: FORMED

## 2024-02-19 NOTE — HOME HEALTH
Skilled reason for visit: wound dressing changes.    Caregiver involvement: daughter present in house during visit.    Medications reviewed and all medications ARE available in the home this visit.    The following education was provided regarding medications:  mobic to be taken once a day, heat and ice and massage to improve with pain is mild, may take tylenol for moderate pain.    MD notified of any discrepancies/look a-like medications/medication interactions: none found  Medications are effective at this time.      Home health supplies by type and quantity ordered/delivered this visit include: none    Patient education provided this visit: see above interventions    Sharps education provided: none ordered    Patient level of understanding of education provided: pt states understanding.    Skilled Care Performed this visit: dressing changed.    Patient response to procedure performed:  pt tolerated well.    Agency Progress toward goals: pt is progressing towards above goals    Patient's Progress towards personal goals: pt's goal is to  be healed up soon.    Home exercise program: as tolerated    Continued need for the following skills: normal    Plan for next visit:2/20

## 2024-02-21 ENCOUNTER — HOME CARE VISIT (OUTPATIENT)
Age: 89
End: 2024-02-21
Payer: MEDICARE

## 2024-02-21 VITALS
RESPIRATION RATE: 16 BRPM | TEMPERATURE: 97.5 F | OXYGEN SATURATION: 99 % | DIASTOLIC BLOOD PRESSURE: 85 MMHG | SYSTOLIC BLOOD PRESSURE: 131 MMHG | HEART RATE: 60 BPM

## 2024-02-21 PROCEDURE — G0299 HHS/HOSPICE OF RN EA 15 MIN: HCPCS

## 2024-02-21 NOTE — HOME HEALTH
Skilled reason for visit: Patient was recently hospitalized for L leg wound , requiring observation by a SN for s/s of decomposition or adverse effects resulting from newly prescribed medications.  Skilled observation needed to determine if new medication regimen prescribed requires modifications or other therapeutic interventions considered until pt's clinical condition or treatment has stabilized.  Caregiver involvement:  Patient's caregiver is family . Caregiver assists patient with bathing, dressing, walking, bathroom, meal prep and setup, medication management, grocery shopping, household chores, transportation to MD appointment and home exercise program.  Medications reconciled and all medications are available in the home this visit.  The following education was provided regarding medications, medication interactions, and look alike modifications.   aspirin 81 MG chewable tablet        Contact Agency or MD with questions.  Medications are effective at this time.  Patient states understanding.    Patient education provided this visit:  .X.... Disease Management: HTN teaching.  See interventions for further teaching  Patient level of understanding of education provided: Pt verbalized understanding of all education and repeated back teaching   Skilled Care Performed this visit: Disease process teaching, medication teaching, physical assessment and monitoring  Patient response to procedure performed:  Pt verbalized satisfaction with wound care  Sharps Education Provided: NA  Goals/teaching progressing. Patient's goal is to have wound healing. Progressing toward goals. Patient has remained free from falls, free from infection; no safety concerns at this time and is ambulating independently.  SN to complete education of patient and patient to follow up with any further questions or concerns with Dr Meier  Home exercise program:  Fall risk prevention  Continued need for the following skills: Nursing  Patient and/or

## 2024-02-24 ENCOUNTER — HOME CARE VISIT (OUTPATIENT)
Age: 89
End: 2024-02-24
Payer: MEDICARE

## 2024-02-24 PROCEDURE — G0299 HHS/HOSPICE OF RN EA 15 MIN: HCPCS

## 2024-02-27 ENCOUNTER — HOME CARE VISIT (OUTPATIENT)
Age: 89
End: 2024-02-27
Payer: MEDICARE

## 2024-02-27 VITALS
OXYGEN SATURATION: 99 % | RESPIRATION RATE: 16 BRPM | HEART RATE: 73 BPM | DIASTOLIC BLOOD PRESSURE: 60 MMHG | TEMPERATURE: 97.3 F | SYSTOLIC BLOOD PRESSURE: 122 MMHG

## 2024-02-27 PROCEDURE — G0299 HHS/HOSPICE OF RN EA 15 MIN: HCPCS

## 2024-02-28 NOTE — HOME HEALTH
following skills: Nursing  Patient and/or caregiver notified and agree to changes in the Plan of Care: No changes  The following discharge planning was discussed with the pt/caregiver:  SN to continue education of patient and discharge patient when teaching and goals are met.

## 2024-02-29 ENCOUNTER — HOSPITAL ENCOUNTER (OUTPATIENT)
Facility: HOSPITAL | Age: 89
Discharge: HOME | End: 2024-02-29
Attending: HOSPITALIST
Payer: MEDICARE

## 2024-02-29 VITALS
HEART RATE: 61 BPM | SYSTOLIC BLOOD PRESSURE: 149 MMHG | RESPIRATION RATE: 18 BRPM | OXYGEN SATURATION: 100 % | TEMPERATURE: 98.1 F | DIASTOLIC BLOOD PRESSURE: 47 MMHG

## 2024-02-29 DIAGNOSIS — S81.802A OPEN WOUND OF LEFT LOWER LEG, INITIAL ENCOUNTER: Primary | ICD-10-CM

## 2024-02-29 PROCEDURE — 11042 DBRDMT SUBQ TIS 1ST 20SQCM/<: CPT

## 2024-02-29 PROCEDURE — 6370000000 HC RX 637 (ALT 250 FOR IP): Performed by: HOSPITALIST

## 2024-02-29 RX ORDER — GINSENG 100 MG
CAPSULE ORAL ONCE
Status: CANCELLED | OUTPATIENT
Start: 2024-02-29 | End: 2024-02-29

## 2024-02-29 RX ORDER — GENTAMICIN SULFATE 1 MG/G
OINTMENT TOPICAL ONCE
Status: CANCELLED | OUTPATIENT
Start: 2024-02-29 | End: 2024-02-29

## 2024-02-29 RX ORDER — SODIUM CHLOR/HYPOCHLOROUS ACID 0.033 %
SOLUTION, IRRIGATION IRRIGATION ONCE
Status: CANCELLED | OUTPATIENT
Start: 2024-02-29 | End: 2024-02-29

## 2024-02-29 RX ORDER — GENTAMICIN SULFATE 1 MG/G
OINTMENT TOPICAL ONCE
OUTPATIENT
Start: 2024-02-29 | End: 2024-02-29

## 2024-02-29 RX ORDER — LIDOCAINE HYDROCHLORIDE 20 MG/ML
JELLY TOPICAL ONCE
Status: DISCONTINUED | OUTPATIENT
Start: 2024-02-29 | End: 2024-02-29 | Stop reason: SDUPTHER

## 2024-02-29 RX ORDER — BACITRACIN ZINC AND POLYMYXIN B SULFATE 500; 1000 [USP'U]/G; [USP'U]/G
OINTMENT TOPICAL ONCE
Status: CANCELLED | OUTPATIENT
Start: 2024-02-29 | End: 2024-02-29

## 2024-02-29 RX ORDER — LIDOCAINE HYDROCHLORIDE 20 MG/ML
JELLY TOPICAL ONCE
OUTPATIENT
Start: 2024-02-29 | End: 2024-02-29

## 2024-02-29 RX ORDER — LIDOCAINE HYDROCHLORIDE 40 MG/ML
SOLUTION TOPICAL ONCE
Status: CANCELLED | OUTPATIENT
Start: 2024-02-29 | End: 2024-02-29

## 2024-02-29 RX ORDER — CLOBETASOL PROPIONATE 0.5 MG/G
OINTMENT TOPICAL ONCE
Status: CANCELLED | OUTPATIENT
Start: 2024-02-29 | End: 2024-02-29

## 2024-02-29 RX ORDER — GINSENG 100 MG
CAPSULE ORAL ONCE
OUTPATIENT
Start: 2024-02-29 | End: 2024-02-29

## 2024-02-29 RX ORDER — TRIAMCINOLONE ACETONIDE 1 MG/G
OINTMENT TOPICAL ONCE
Status: CANCELLED | OUTPATIENT
Start: 2024-02-29 | End: 2024-02-29

## 2024-02-29 RX ORDER — BETAMETHASONE DIPROPIONATE 0.5 MG/G
CREAM TOPICAL ONCE
Status: CANCELLED | OUTPATIENT
Start: 2024-02-29 | End: 2024-02-29

## 2024-02-29 RX ORDER — IBUPROFEN 200 MG
TABLET ORAL ONCE
Status: CANCELLED | OUTPATIENT
Start: 2024-02-29 | End: 2024-02-29

## 2024-02-29 RX ORDER — LIDOCAINE 50 MG/G
OINTMENT TOPICAL ONCE
Status: CANCELLED | OUTPATIENT
Start: 2024-02-29 | End: 2024-02-29

## 2024-02-29 RX ORDER — LIDOCAINE 50 MG/G
OINTMENT TOPICAL ONCE
OUTPATIENT
Start: 2024-02-29 | End: 2024-02-29

## 2024-02-29 RX ORDER — LIDOCAINE 40 MG/G
CREAM TOPICAL ONCE
OUTPATIENT
Start: 2024-02-29 | End: 2024-02-29

## 2024-02-29 RX ORDER — LIDOCAINE 40 MG/G
CREAM TOPICAL ONCE
Status: CANCELLED | OUTPATIENT
Start: 2024-02-29 | End: 2024-02-29

## 2024-02-29 RX ORDER — LIDOCAINE HYDROCHLORIDE 20 MG/ML
JELLY TOPICAL ONCE
Status: COMPLETED | OUTPATIENT
Start: 2024-02-29 | End: 2024-02-29

## 2024-02-29 RX ORDER — BETAMETHASONE DIPROPIONATE 0.5 MG/G
CREAM TOPICAL ONCE
OUTPATIENT
Start: 2024-02-29 | End: 2024-02-29

## 2024-02-29 RX ORDER — LIDOCAINE HYDROCHLORIDE 20 MG/ML
JELLY TOPICAL ONCE
Status: CANCELLED | OUTPATIENT
Start: 2024-02-29 | End: 2024-02-29

## 2024-02-29 RX ORDER — SODIUM CHLOR/HYPOCHLOROUS ACID 0.033 %
SOLUTION, IRRIGATION IRRIGATION ONCE
OUTPATIENT
Start: 2024-02-29 | End: 2024-02-29

## 2024-02-29 RX ORDER — BACITRACIN ZINC AND POLYMYXIN B SULFATE 500; 1000 [USP'U]/G; [USP'U]/G
OINTMENT TOPICAL ONCE
OUTPATIENT
Start: 2024-02-29 | End: 2024-02-29

## 2024-02-29 RX ORDER — TRIAMCINOLONE ACETONIDE 1 MG/G
OINTMENT TOPICAL ONCE
OUTPATIENT
Start: 2024-02-29 | End: 2024-02-29

## 2024-02-29 RX ORDER — CLOBETASOL PROPIONATE 0.5 MG/G
OINTMENT TOPICAL ONCE
OUTPATIENT
Start: 2024-02-29 | End: 2024-02-29

## 2024-02-29 RX ORDER — IBUPROFEN 200 MG
TABLET ORAL ONCE
OUTPATIENT
Start: 2024-02-29 | End: 2024-02-29

## 2024-02-29 RX ORDER — LIDOCAINE HYDROCHLORIDE 40 MG/ML
SOLUTION TOPICAL ONCE
OUTPATIENT
Start: 2024-02-29 | End: 2024-02-29

## 2024-02-29 RX ADMIN — LIDOCAINE HYDROCHLORIDE: 20 JELLY TOPICAL at 13:53

## 2024-02-29 NOTE — FLOWSHEET NOTE
02/29/24 1348   Wound 01/11/24 Ankle Left;Lateral   Date First Assessed: 01/11/24   Present on Original Admission: Yes  Wound Approximate Age at First Assessment (Weeks): 3 weeks  Primary Wound Type: Venous Ulcer  Location: Ankle  Wound Location Orientation: Left;Lateral   Wound Image    Wound Etiology Traumatic   Dressing Status New dressing applied   Wound Cleansed Wound cleanser   Dressing/Treatment Honey gel/honey paste;Tubular bandage   Offloading for Diabetic Foot Ulcers Offloading not required   Dressing Change Due 03/07/24   Wound Length (cm) 1.2 cm   Wound Width (cm) 0.7 cm   Wound Depth (cm) 0.1 cm   Wound Surface Area (cm^2) 0.84 cm^2   Change in Wound Size % (l*w) 37.78   Wound Volume (cm^3) 0.084 cm^3   Wound Healing % 79   Post-Procedure Length (cm) 1.3 cm   Post-Procedure Width (cm) 0.8 cm   Post-Procedure Depth (cm) 0.4 cm   Post-Procedure Surface Area (cm^2) 1.04 cm^2   Post-Procedure Volume (cm^3) 0.416 cm^3   Wound Assessment Devitalized tissue   Drainage Amount Small (< 25%)   Drainage Description Serosanguinous   Odor None   Kathy-wound Assessment Dry/flaky;Intact   Margins Defined edges   Wound Thickness Description not for Pressure Injury Full thickness     Tubigrip size E applied to leg

## 2024-02-29 NOTE — PLAN OF CARE
Discharge Instructions from  Wound Care Clinic at [facility]   [address]   [address]  [place] [address]  [phone] Fax [phone]    NAME:  Angela Lucero  YOB: 1931  MEDICAL RECORD NUMBER:  [id number]  DATE:  2/29/2024    Wound Cleansing:   Do not scrub or use excessive force.  Cleanse wound prior to applying a clean dressing with:  [x] Normal Saline or    [x] Wound Cleanser   [] Cleanse wound with Mild Soap & Water  [] May Shower at Discharge   [] Other:      Topical Treatments:  Do not apply lotions, creams, or ointments to wound bed unless directed.   [] Apply moisturizing lotion to skin surrounding the wound prior to dressing change.  [] Apply antifungal ointment to skin surrounding the wound prior to dressing change.  [] Apply thin film of moisture barrier ointment to skin immediately around wound.  [] Other:       Dressings:           Wound Location Left Leg   [x] Apply Primary Dressing:       [x] MediHoney Gel [] Alginate with Silver [] Alginate   [] Collagen [] Collagen with Silver   [] Santyl with Moisten saline gauze     [] Hydrocolloid   [] MediHoney Alginate [] Foam with Silver   [] Foam   [] Hydrofera Blue    [] Mepilex Border    [] Moisten with Saline [] Hydrogel [] Mepitel     [] Bactroban/Mupirocin [] Polysporin  [] Other:    [] Pack wound loosely with  [] Iodoform   [] Plain Packing  [] Other   [] Cover and Secure with:     [x] Gauze [] Kade [] Kerlix   [] Ace Wrap [] Cover Roll Tape [] ABD     [x] Other: Mepilex border   Avoid contact of tape with skin.  [x] Change dressing: [] Daily    [] Every Other Day [] Three times per week   [x] Once a week (pt comes back to Wound Clinic on 3/7/2024)         Pressure Relief:  [] When sitting, shift position or do seat lifts every 15 minutes.  [] Wheelchair cushion [] Specialty Bed/Mattress  [] Turn every 2 hours when in bed.  Avoid position directing pressure on wound site.  Limit side lying to 30 degree 
SHERIN Begum

## 2024-03-01 NOTE — WOUND CARE
[facility]   Medical Staff Progress Note     Angela Lucero  MEDICAL RECORD NUMBER:  [id number]  AGE: 89 y.o.   GENDER: female  : 1931  EPISODE DATE:  2024    Chief complaint and reason for visit:     No chief complaint on file.     Patient presenting for follow up evaluation of wound(s) per chief complaint.      Subjective: Symptoms, wound related issues, or other pertinent wound history since last visit:     HISTORY of PRESENT ILLNESS HPI     Angela Lucero is a 89 y.o. female who presents today for wound/ulcer evaluation.     History of Wound Context: she reports she has history of psoriasis, she may have scratched her left lower leg resulting in open wound. She was seen by PCP and she was given antibiotics which she completed course. She continued to have open wound. she had left 2nd toe removed secondary to osteomyelitis. She has good distal pulses.        REVIEW OF SYSTEMS  A comprehensive review of systems was negative except for what has been indicated above and:     Objective:    BP (!) 149/47   Pulse 61   Temp 98.1 °F (36.7 °C) (Oral)   Resp 18   SpO2 100%   Wt Readings from Last 3 Encounters:   23 70.8 kg (156 lb 1.4 oz)       PHYSICAL EXAM  General: Alert and in no acute distress. Normal appearing  Skin: as described below  Head: Normocephalic and atraumatic  Eyes: Extraocular eye movements intact, conjunctivae normal, and sclera anicteric  ENT: Hearing grossly normal bilaterally. Normal appearance  Respiratory: no chest wall tenderness. no respiratory distress  GI: Abdomen non-tender and benign  Musculoskeletal: Baseline range of motion in joints. Nontender calves. No cyanosis. Edema negative.  Neurologic: Speech normal. At baseline without new focal deficits. Mental status normal or at baseline.    Medical Decision Making:   Assessment :   Open wound of left lower leg S81.802A     Medical Decision Making:           Problems

## 2024-03-02 ENCOUNTER — HOME CARE VISIT (OUTPATIENT)
Age: 89
End: 2024-03-02
Payer: MEDICARE

## 2024-03-05 ENCOUNTER — HOME CARE VISIT (OUTPATIENT)
Age: 89
End: 2024-03-05
Payer: MEDICARE

## 2024-03-05 VITALS
HEART RATE: 67 BPM | TEMPERATURE: 97.4 F | DIASTOLIC BLOOD PRESSURE: 76 MMHG | OXYGEN SATURATION: 99 % | SYSTOLIC BLOOD PRESSURE: 160 MMHG | RESPIRATION RATE: 16 BRPM

## 2024-03-05 PROCEDURE — G0299 HHS/HOSPICE OF RN EA 15 MIN: HCPCS

## 2024-03-05 ASSESSMENT — ENCOUNTER SYMPTOMS
CONSTIPATION: 1
PAIN LOCATION - PAIN QUALITY: CONSTANT

## 2024-03-06 NOTE — HOME HEALTH
Skilled reason for visit: Patient was recently hospitalized for L lower leg wound , requiring observation by a SN for s/s of decomposition or adverse effects resulting from newly prescribed medications.  Skilled observation needed to determine if new medication regimen prescribed requires modifications or other therapeutic interventions considered until pt's clinical condition or treatment has stabilized.  Caregiver involvement:  Patient's caregiver is her family . Caregiver assists patient with bathing, dressing, walking, bathroom, meal prep and setup, medication management, grocery shopping, household chores, transportation to MD appointment and home exercise program.  Medications reconciled and all medications are available in the home this visit.  The following education was provided regarding medications, medication interactions, and look alike modifications.  meloxicam (MOBIC) 15 MG tablet         Contact Agency or MD with questions.  Medications are effective at this time.  Patient states understanding.    Patient education provided this visit:  .X.... Disease Management: Pain management, constipation prevention.  See interventions for further teaching  Patient level of understanding of education provided: Pt verbalized understanding of all education and repeated back teaching   Skilled Care Performed this visit: Disease process teaching, medication teaching, physical assessment and monitoring  Patient response to procedure performed:  Pt verbalized satisfaction with wound care  Sharps Education Provided: NA  Goals/teaching progressing. Patient's goal is to have wound healing. Progressing toward goals. Patient has remained free from falls, free from infection; no safety concerns at this time and is ambulating independently.  SN to complete education of patient and patient to follow up with any further questions or concerns with Dr Meier  Home exercise program:  Fall risk prevention  Continued need for the

## 2024-03-08 ENCOUNTER — HOME CARE VISIT (OUTPATIENT)
Age: 89
End: 2024-03-08
Payer: MEDICARE

## 2024-03-08 PROCEDURE — G0299 HHS/HOSPICE OF RN EA 15 MIN: HCPCS

## 2024-03-09 VITALS
RESPIRATION RATE: 16 BRPM | SYSTOLIC BLOOD PRESSURE: 111 MMHG | DIASTOLIC BLOOD PRESSURE: 60 MMHG | OXYGEN SATURATION: 98 % | TEMPERATURE: 97.4 F | HEART RATE: 65 BPM

## 2024-03-09 ASSESSMENT — ENCOUNTER SYMPTOMS
CONSTIPATION: 1
PAIN LOCATION - PAIN QUALITY: CONSTANT

## 2024-03-09 NOTE — HOME HEALTH
Skilled reason for visit: Patient was recently hospitalized for L leg wound, requiring observation by a SN for s/s of decomposition or adverse effects resulting from newly prescribed medications.  Skilled observation needed to determine if new medication regimen prescribed requires modifications or other therapeutic interventions considered until pt's clinical condition or treatment has stabilized.  Caregiver involvement:  Patient's caregiver is her family. Caregiver assists patient with bathing, dressing, walking, bathroom, meal prep and setup, medication management, grocery shopping, household chores, transportation to MD appointment and home exercise program.  Medications reconciled and all medications are available in the home this visit.  The following education was provided regarding medications, medication interactions, and look alike modifications.   pregabalin (LYRICA) 50 MG capsule        Contact Agency or MD with questions.  Medications are effective at this time.  Patient states understanding.    Patient education provided this visit:  .X.... Disease Management: Pain management teaching, constipation.  See interventions for further teaching  Patient level of understanding of education provided: Pt verbalized understanding of all education and repeated back teaching   Skilled Care Performed this visit: Disease process teaching, medication teaching, physical assessment and monitoring  Patient response to procedure performed:  Pt verbalized satisfaction with wound care  Sharps Education Provided: NA  Goals/teaching progressing. Patient's goal is to reduced pain. Progressing toward goals. Patient has remained free from falls, free from infection; no safety concerns at this time and is ambulating independently.  SN to complete education of patient and patient to follow up with any further questions or concerns with Dr Meier  Home exercise program:  Fall risk prevention  Continued need for the following skills:

## 2024-03-12 ENCOUNTER — HOME CARE VISIT (OUTPATIENT)
Age: 89
End: 2024-03-12
Payer: MEDICARE

## 2024-03-12 VITALS
DIASTOLIC BLOOD PRESSURE: 65 MMHG | HEART RATE: 64 BPM | SYSTOLIC BLOOD PRESSURE: 126 MMHG | OXYGEN SATURATION: 100 % | TEMPERATURE: 97.3 F | RESPIRATION RATE: 16 BRPM

## 2024-03-12 PROCEDURE — G0299 HHS/HOSPICE OF RN EA 15 MIN: HCPCS

## 2024-03-12 ASSESSMENT — ENCOUNTER SYMPTOMS: PAIN LOCATION - PAIN QUALITY: INTERMITTENT

## 2024-03-12 NOTE — HOME HEALTH
Nursing  Patient and/or caregiver notified and agree to changes in the Plan of Care: No changes  The following discharge planning was discussed with the pt/caregiver:  SN to continue education of patient and discharge patient when teaching and goals are met.

## 2024-03-19 ENCOUNTER — HOME CARE VISIT (OUTPATIENT)
Age: 89
End: 2024-03-19
Payer: MEDICARE

## (undated) DEVICE — DRESSING,GAUZE,XEROFORM,CURAD,1"X8",ST: Brand: CURAD

## (undated) DEVICE — GLOVE SURG SZ 75 L12IN FNGR THK79MIL GRN LTX FREE

## (undated) DEVICE — SOLUTION IRRIG 500ML 0.9% SOD CHLO USP POUR PLAS BTL

## (undated) DEVICE — 3M™ BAIR PAWS FLEX™ WARMING GOWN, SMALL, 20 PER CASE 81103: Brand: BAIR PAWS™

## (undated) DEVICE — PRECISION (9.0 X 0.51 X 25.0MM)

## (undated) DEVICE — SWAB CULT LIQ STUART AGR AERB MOD IN BRK SGL RAYON TIP PLAS 220099] BECTON DICKINSON MICRO]

## (undated) DEVICE — SPONGE GZ W4XL4IN COT 12 PLY TYP VII WVN C FLD DSGN STERILE

## (undated) DEVICE — PAD,ABDOMINAL,5"X9",ST,LF,25/BX: Brand: MEDLINE INDUSTRIES, INC.

## (undated) DEVICE — TOWEL,OR,DSP,ST,BLUE,STD,4/PK,20PK/CS: Brand: MEDLINE

## (undated) DEVICE — BNDG,ELSTC,MATRIX,STRL,3"X5YD,LF,HOOK&LP: Brand: MEDLINE

## (undated) DEVICE — BANDAGE,GAUZE,BULKEE II,4.5"X4.1YD,STRL: Brand: MEDLINE

## (undated) DEVICE — SUTURE ETHLN SZ 3-0 L18IN NONABSORBABLE BLK PS-2 L19MM 3/8 1669H

## (undated) DEVICE — BANDAGE COMPR W4INXL10YD WHITE/BEIGE E MTRX HK LOOP CLSR

## (undated) DEVICE — PREMIUM WET SKIN PREP TRAY: Brand: MEDLINE INDUSTRIES, INC.

## (undated) DEVICE — PACK PROCEDURE SURG EXTREMITY CUST

## (undated) DEVICE — GARMENT,MEDLINE,DVT,INT,CALF,MED, GEN2: Brand: MEDLINE

## (undated) DEVICE — GLOVE ORANGE PI 7 1/2   MSG9075

## (undated) DEVICE — INTENDED FOR TISSUE SEPARATION, AND OTHER PROCEDURES THAT REQUIRE A SHARP SURGICAL BLADE TO PUNCTURE OR CUT.: Brand: BARD-PARKER ® CARBON RIB-BACK BLADES

## (undated) DEVICE — DRESSING PETRO W3XL8IN N ADH OIL EMUL GZ CURAD

## (undated) DEVICE — SYRINGE MED 10ML LUERLOCK TIP W/O SFTY DISP